# Patient Record
Sex: MALE | Race: WHITE | NOT HISPANIC OR LATINO | Employment: FULL TIME | URBAN - NONMETROPOLITAN AREA
[De-identification: names, ages, dates, MRNs, and addresses within clinical notes are randomized per-mention and may not be internally consistent; named-entity substitution may affect disease eponyms.]

---

## 2018-11-08 ENCOUNTER — OFFICE VISIT (OUTPATIENT)
Dept: ORTHOPEDIC SURGERY | Facility: CLINIC | Age: 63
End: 2018-11-08

## 2018-11-08 VITALS — HEIGHT: 71 IN | BODY MASS INDEX: 30.1 KG/M2 | WEIGHT: 215 LBS

## 2018-11-08 DIAGNOSIS — S46.002S OSTEOARTHRITIS OF LEFT SHOULDER DUE TO ROTATOR CUFF INJURY: Primary | ICD-10-CM
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
## 2018-11-08 DIAGNOSIS — S32.591A PUBIC RAMUS FRACTURE, RIGHT, CLOSED, INITIAL ENCOUNTER (HCC): ICD-10-CM

## 2018-11-08 DIAGNOSIS — M25.511 ACUTE PAIN OF RIGHT SHOULDER: ICD-10-CM

## 2018-11-08 DIAGNOSIS — M19.112 OSTEOARTHRITIS OF LEFT SHOULDER DUE TO ROTATOR CUFF INJURY: Primary | ICD-10-CM

## 2018-11-08 PROCEDURE — 99204 OFFICE O/P NEW MOD 45 MIN: CPT | Performed by: ORTHOPAEDIC SURGERY

## 2018-11-08 RX ORDER — TRAZODONE HYDROCHLORIDE 50 MG/1
TABLET ORAL
COMMUNITY
Start: 2018-11-01 | End: 2022-03-21

## 2018-11-08 RX ORDER — HYDROCHLOROTHIAZIDE 25 MG/1
TABLET ORAL
COMMUNITY
Start: 2018-08-31 | End: 2023-01-23

## 2018-11-08 RX ORDER — LOSARTAN POTASSIUM 50 MG/1
50 TABLET ORAL DAILY
COMMUNITY
Start: 2018-08-31 | End: 2021-09-20 | Stop reason: SDUPTHER

## 2018-11-08 RX ORDER — HYDROCODONE BITARTRATE AND ACETAMINOPHEN 5; 325 MG/1; MG/1
TABLET ORAL
COMMUNITY
Start: 2018-11-05 | End: 2022-03-21
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
## 2018-11-17 PROBLEM — S32.591A PUBIC RAMUS FRACTURE, RIGHT, CLOSED, INITIAL ENCOUNTER (HCC): Status: ACTIVE | Noted: 2018-11-17
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
## 2018-11-17 PROBLEM — M25.511 ACUTE PAIN OF RIGHT SHOULDER: Status: ACTIVE | Noted: 2018-11-17

## 2018-11-26 ENCOUNTER — DOCUMENTATION (OUTPATIENT)
Dept: ORTHOPEDIC SURGERY | Facility: CLINIC | Age: 63
End: 2018-11-26

## 2018-12-04 ENCOUNTER — OFFICE VISIT (OUTPATIENT)
Dept: ORTHOPEDIC SURGERY | Facility: CLINIC | Age: 63
End: 2018-12-04

## 2018-12-04 DIAGNOSIS — M25.511 ACUTE PAIN OF RIGHT SHOULDER: ICD-10-CM

## 2018-12-04 DIAGNOSIS — M25.511 RIGHT SHOULDER PAIN, UNSPECIFIED CHRONICITY: Primary | ICD-10-CM

## 2018-12-04 DIAGNOSIS — S32.591A PUBIC RAMUS FRACTURE, RIGHT, CLOSED, INITIAL ENCOUNTER (HCC): ICD-10-CM

## 2018-12-04 PROCEDURE — 99214 OFFICE O/P EST MOD 30 MIN: CPT | Performed by: ORTHOPAEDIC SURGERY

## 2018-12-04 NOTE — PROGRESS NOTES
Chief Complaint   Patient presents with   • Right Shoulder - Follow-up           HPI  Patient is here today for a follow up of his MRI of his right shoulder.  The patient is doing a little bit better but he continues to limp.  He has quite a bit of pain and discomfort over the superior and inferior pubic ramus fractures.  He is neurovascularly intact.  He does not have any shortening of the lower extremity.  He does not have a neurological deficit.  His right shoulder is bothering him considerably.  The patient is working hard with his physical therapist to improve the range of motion.  His pain tolerance has gotten somewhat better as time has gone by.  He does not have any instability of the shoulder.  He has a lot of pain and discomfort with cross body activity.  The patient has a sense of clicking and catching of the shoulder.  His symptoms appear to correlate with his MRI findings which are consistent with a labrum tear and possibly an injury to the anchor of the long head of the biceps tendon.          There were no vitals filed for this visit.        Review of Systems   Constitutional: Negative.    HENT: Negative.    Eyes: Negative.    Respiratory: Negative.    Cardiovascular: Negative.    Gastrointestinal: Negative.    Endocrine: Negative.    Genitourinary: Negative.    Musculoskeletal: Positive for gait problem.   Skin: Negative.    Allergic/Immunologic: Negative.    Hematological: Negative.    Psychiatric/Behavioral: Negative.            Physical Exam   Constitutional: He is oriented to person, place, and time. He appears well-nourished.   HENT:   Head: Atraumatic.   Eyes: EOM are normal.   Neck: Neck supple.   Cardiovascular: Normal rate and intact distal pulses.   Pulmonary/Chest: Effort normal and breath sounds normal.   Abdominal: Bowel sounds are normal.   Musculoskeletal: Normal range of motion.   Neurological: He is oriented to person, place, and time.   Skin: Skin is warm. Capillary refill takes 2  to 3 seconds.   Psychiatric: He has a normal mood and affect.   Vitals reviewed.          Joint/Body Part Specific Exam:  right shoulder. The glenohumeral joint is extremely tender over the anterior aspect of the articulation. Axillary nerve function is well preserved. Radial artery pulses are palpable. Forward flexion is associated with a click and a distinct pop. Neer sign is positive. EER sign is positive. Cee sign is positive. Skin and soft tissues are slightly swollen. AC joint is mildly tender. The pain can be reproduced with external rotation, abduction and axial loading of the joint. There is no evidence of multidirectional instability. The pain level is 6.  Flexion is 0-90°.  Active abduction is 0-120°.  Internal and external rotation associated with pain and discomfort.    Right hip and pubic ramus:    The patient does not have a clinical deformity of the hip.  Neurovascular status is intact.  He is able to perform a straight leg raise exam without too much difficulty.  Skin and soft tissues are swollen consistent with a superior pubic ramus fracture.  He does not have a neurological deficit.  There is no evidence of a sacral fracture.      X-RAY Report:        Diagnostics:  MRI reports are discussed with the patient and his wife in the office today.  These images are available today and are the bases of my recommendation for treatment.  He has a high-grade partial sided articular I did tear of the rotator cuff at its insertion on the supraspinatus.  There is an abnormal biceps labrum complex with a tear of the biceps labrum.  The distal part of the biceps tendon is thin and attenuated.  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      Dennis was seen today for follow-up.    Diagnoses and all orders for this visit:    Right shoulder pain, unspecified chronicity  -     Ambulatory Referral to Physical  Therapy    Pubic ramus fracture, right, closed, initial encounter (CMS/[facility])    Acute pain of right shoulder            Procedures        Plan: Stretching and strengthening exercises of the shoulder to prevent arthrofibrosis and a frozen shoulder.    Weightbearing as tolerated.    Continue with outpatient physical therapy to improve the gait mechanics and also to improve the range of motion and strength of shoulder function.    Intra-articular steroid injection discussed and offered to the patient for symptom control.    Tablet ibuprofen 600 mg orally twice a day for pain swelling and discomfort.    The patient is been given a note to return back to work on light duty status on 5 December 2018.  He will be on a light duty status limited to not lifting any weights heavier than 10 pounds.  He is also recommended not to lift any heavy weights in the overhead position.    Discussed with the patient that he might eventually need arthroscopic surgery for management of the labrum tear as well as the rotator cuff injury.    Risks and benefits of the surgical intervention discussed with the patient and his wife in great detail.    Patient has been diagnosed with a rotator cuff tear.  These tears can range from partial tears to complete tears of the muscle and/or tendon. They can also be categorized in size by width as being small, medium or large.  Diagnosis is confirmed with MRI or CT/arthrogram.  Management of these tears can be conservative with injections, physical therapy, activity modification and use of NSAIDS as needed.  Surgery is the only way to actually fix these tears, as they will not heal or repair on their own.  These tears can usually be repaired with arthroscopic surgery, but occasionally open procedures are necessary.  Many factors can influence the outcomes. First, larger tears have a higher chance of failure from a healing perspective, although pain may improve nonetheless, potentially longer recovery and  sometimes, depending on the quality of the tissue and the length of time the tear has been present, may not be repairable at all.  If the tear has been going on for a long time, the muscle can actually change to fatty tissue, and no longer act as muscle.  This can have a direct effect on not only the ability to repair the tear but also the outcome from the surgery itself both from a healing and functional perspective.  Therefore, the decision to proceed with surgery does have a time factor which can affect the quality of the tissue and reparability but also the potential for the tear to increase in size.  The longer you wait to repair the torn tendon there can be decreased potential for a successful outcome.    Risks of surgery have been discussed with the patient in detail.  These risks include but are not limited to possibility of infection, DVT, continued pain, continued progression of arthritis, use of allograft tissue, limited range of motion and strength and further surgical intervention.  Patient understands that the surgery will benefit mechanical symptoms of pain and instability but may not help with symptoms of arthritis.      I spent in the office today with the patient is 30 minutes of which greater than 50% of my time was spent face-to-face with the patient going over the treatment options including the time off potential surgical intervention and the healing without surgical intervention as well.

## 2019-01-29 ENCOUNTER — DOCUMENTATION (OUTPATIENT)
Dept: ORTHOPEDIC SURGERY | Facility: CLINIC | Age: 64
End: 2019-01-29

## 2019-01-29 ENCOUNTER — OFFICE VISIT (OUTPATIENT)
Dept: ORTHOPEDIC SURGERY | Facility: CLINIC | Age: 64
End: 2019-01-29

## 2019-01-29 DIAGNOSIS — M25.511 ACUTE PAIN OF RIGHT SHOULDER: ICD-10-CM

## 2019-01-29 DIAGNOSIS — S32.591A PUBIC RAMUS FRACTURE, RIGHT, CLOSED, INITIAL ENCOUNTER (HCC): ICD-10-CM
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
## 2019-01-29 PROCEDURE — 27197 CLSD TX PELVIC RING FX: CPT | Performed by: ORTHOPAEDIC SURGERY

## 2019-01-29 PROCEDURE — 99213 OFFICE O/P EST LOW 20 MIN: CPT | Performed by: ORTHOPAEDIC SURGERY

## 2019-03-12 ENCOUNTER — OFFICE VISIT (OUTPATIENT)
Dept: ORTHOPEDIC SURGERY | Facility: CLINIC | Age: 64
End: 2019-03-12

## 2019-03-12 DIAGNOSIS — M25.511 RIGHT SHOULDER PAIN, UNSPECIFIED CHRONICITY: Primary | ICD-10-CM

## 2019-03-12 DIAGNOSIS — S32.591A PUBIC RAMUS FRACTURE, RIGHT, CLOSED, INITIAL ENCOUNTER (HCC): ICD-10-CM

## 2019-03-12 PROCEDURE — 73030 X-RAY EXAM OF SHOULDER: CPT | Performed by: ORTHOPAEDIC SURGERY

## 2019-03-12 PROCEDURE — 99213 OFFICE O/P EST LOW 20 MIN: CPT | Performed by: ORTHOPAEDIC SURGERY
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
## 2019-03-24 PROBLEM — M25.511 RIGHT SHOULDER PAIN: Status: ACTIVE | Noted: 2019-03-24

## 2019-05-20 ENCOUNTER — CONVERSION ENCOUNTER (OUTPATIENT)
Dept: OTHER | Facility: HOSPITAL | Age: 64
End: 2019-05-20

## 2019-05-20 ENCOUNTER — OFFICE VISIT CONVERTED (OUTPATIENT)
Dept: CARDIOLOGY | Facility: CLINIC | Age: 64
End: 2019-05-20
Attending: SPECIALIST

## 2019-06-03 ENCOUNTER — CONVERSION ENCOUNTER (OUTPATIENT)
Dept: CARDIOLOGY | Facility: CLINIC | Age: 64
End: 2019-06-03
Attending: SPECIALIST

## 2019-06-13 ENCOUNTER — OFFICE VISIT (OUTPATIENT)
Dept: ORTHOPEDIC SURGERY | Facility: CLINIC | Age: 64
End: 2019-06-13
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
## 2019-06-13 DIAGNOSIS — M17.31 POST-TRAUMATIC OSTEOARTHRITIS OF RIGHT KNEE: ICD-10-CM

## 2019-06-13 DIAGNOSIS — S32.591A PUBIC RAMUS FRACTURE, RIGHT, CLOSED, INITIAL ENCOUNTER (HCC): ICD-10-CM

## 2019-06-13 PROCEDURE — 99214 OFFICE O/P EST MOD 30 MIN: CPT | Performed by: ORTHOPAEDIC SURGERY

## 2019-06-30 PROBLEM — M17.31 POST-TRAUMATIC OSTEOARTHRITIS OF RIGHT KNEE: Status: ACTIVE | Noted: 2019-06-30

## 2019-09-12 ENCOUNTER — OFFICE VISIT (OUTPATIENT)
Dept: ORTHOPEDIC SURGERY | Facility: CLINIC | Age: 64
End: 2019-09-12

## 2019-09-12 DIAGNOSIS — S32.591A PUBIC RAMUS FRACTURE, RIGHT, CLOSED, INITIAL ENCOUNTER (HCC): ICD-10-CM

## 2019-09-12 DIAGNOSIS — M25.511 RIGHT SHOULDER PAIN, UNSPECIFIED CHRONICITY: ICD-10-CM
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
## 2019-09-12 PROCEDURE — 99213 OFFICE O/P EST LOW 20 MIN: CPT | Performed by: ORTHOPAEDIC SURGERY

## 2019-09-12 RX ORDER — SILDENAFIL 100 MG/1
TABLET, FILM COATED ORAL
Refills: 0 | COMMUNITY
Start: 2019-06-27 | End: 2022-03-21

## 2019-09-12 RX ORDER — MONTELUKAST SODIUM 10 MG/1
TABLET ORAL
COMMUNITY
Start: 2019-09-11 | End: 2022-03-21
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
## 2019-09-23 ENCOUNTER — OFFICE VISIT CONVERTED (OUTPATIENT)
Dept: CARDIOLOGY | Facility: CLINIC | Age: 64
End: 2019-09-23
Attending: SPECIALIST

## 2019-12-12 ENCOUNTER — OFFICE VISIT (OUTPATIENT)
Dept: ORTHOPEDIC SURGERY | Facility: CLINIC | Age: 64
End: 2019-12-12

## 2019-12-12 VITALS — HEIGHT: 71 IN | BODY MASS INDEX: 31.78 KG/M2 | WEIGHT: 227 LBS

## 2019-12-12 DIAGNOSIS — S32.591A PUBIC RAMUS FRACTURE, RIGHT, CLOSED, INITIAL ENCOUNTER (HCC): Primary | ICD-10-CM

## 2019-12-12 DIAGNOSIS — M25.511 CHRONIC RIGHT SHOULDER PAIN: ICD-10-CM

## 2019-12-12 DIAGNOSIS — G89.29 CHRONIC RIGHT SHOULDER PAIN: ICD-10-CM

## 2019-12-12 PROCEDURE — 99213 OFFICE O/P EST LOW 20 MIN: CPT | Performed by: ORTHOPAEDIC SURGERY
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
## 2020-03-19 ENCOUNTER — OFFICE VISIT (OUTPATIENT)
Dept: ORTHOPEDIC SURGERY | Facility: CLINIC | Age: 65
End: 2020-03-19

## 2020-03-19 VITALS — WEIGHT: 219 LBS | BODY MASS INDEX: 31.35 KG/M2 | TEMPERATURE: 97.5 F | HEIGHT: 70 IN

## 2020-03-19 DIAGNOSIS — M25.511 RIGHT SHOULDER PAIN, UNSPECIFIED CHRONICITY: Primary | ICD-10-CM

## 2020-03-19 DIAGNOSIS — S32.591A PUBIC RAMUS FRACTURE, RIGHT, CLOSED, INITIAL ENCOUNTER (HCC): ICD-10-CM

## 2020-03-19 PROCEDURE — 99213 OFFICE O/P EST LOW 20 MIN: CPT | Performed by: ORTHOPAEDIC SURGERY

## 2020-03-19 PROCEDURE — 73502 X-RAY EXAM HIP UNI 2-3 VIEWS: CPT | Performed by: ORTHOPAEDIC SURGERY

## 2020-03-19 PROCEDURE — 20610 DRAIN/INJ JOINT/BURSA W/O US: CPT | Performed by: ORTHOPAEDIC SURGERY

## 2020-03-19 RX ADMIN — METHYLPREDNISOLONE ACETATE 160 MG: 80 INJECTION, SUSPENSION INTRA-ARTICULAR; INTRALESIONAL; INTRAMUSCULAR; SOFT TISSUE at 14:14

## 2020-03-19 RX ADMIN — LIDOCAINE HYDROCHLORIDE 2 ML: 10 INJECTION, SOLUTION EPIDURAL; INFILTRATION; INTRACAUDAL; PERINEURAL at 14:14

## 2020-04-02 RX ORDER — LIDOCAINE HYDROCHLORIDE 10 MG/ML
2 INJECTION, SOLUTION EPIDURAL; INFILTRATION; INTRACAUDAL; PERINEURAL
Status: COMPLETED | OUTPATIENT
Start: 2020-03-19 | End: 2020-03-19

## 2020-04-02 RX ORDER — METHYLPREDNISOLONE ACETATE 80 MG/ML
160 INJECTION, SUSPENSION INTRA-ARTICULAR; INTRALESIONAL; INTRAMUSCULAR; SOFT TISSUE
Status: COMPLETED | OUTPATIENT
Start: 2020-03-19 | End: 2020-03-19

## 2020-07-09 ENCOUNTER — OFFICE VISIT (OUTPATIENT)
Dept: ORTHOPEDIC SURGERY | Facility: CLINIC | Age: 65
End: 2020-07-09

## 2020-07-09 ENCOUNTER — HOSPITAL ENCOUNTER (OUTPATIENT)
Dept: OTHER | Facility: HOSPITAL | Age: 65
Discharge: HOME | End: 2020-07-09
Attending: ORTHOPAEDIC SURGERY

## 2020-07-09 DIAGNOSIS — S32.591A PUBIC RAMUS FRACTURE, RIGHT, CLOSED, INITIAL ENCOUNTER (HCC): Primary | ICD-10-CM

## 2020-07-09 DIAGNOSIS — M25.551 RIGHT HIP PAIN: ICD-10-CM

## 2020-07-09 PROCEDURE — 99213 OFFICE O/P EST LOW 20 MIN: CPT | Performed by: ORTHOPAEDIC SURGERY

## 2020-07-09 PROCEDURE — 20610 DRAIN/INJ JOINT/BURSA W/O US: CPT | Performed by: ORTHOPAEDIC SURGERY

## 2020-07-09 RX ADMIN — METHYLPREDNISOLONE ACETATE 160 MG: 80 INJECTION, SUSPENSION INTRA-ARTICULAR; INTRALESIONAL; INTRAMUSCULAR; SOFT TISSUE at 16:00

## 2020-07-09 RX ADMIN — LIDOCAINE HYDROCHLORIDE 2 ML: 10 INJECTION, SOLUTION INFILTRATION; PERINEURAL at 16:00

## 2020-07-20 PROBLEM — M25.551 RIGHT HIP PAIN: Status: ACTIVE | Noted: 2020-07-20

## 2020-07-20 RX ORDER — METHYLPREDNISOLONE ACETATE 80 MG/ML
160 INJECTION, SUSPENSION INTRA-ARTICULAR; INTRALESIONAL; INTRAMUSCULAR; SOFT TISSUE
Status: COMPLETED | OUTPATIENT
Start: 2020-07-09 | End: 2020-07-09

## 2020-07-20 RX ORDER — LIDOCAINE HYDROCHLORIDE 10 MG/ML
2 INJECTION, SOLUTION INFILTRATION; PERINEURAL
Status: COMPLETED | OUTPATIENT
Start: 2020-07-09 | End: 2020-07-09

## 2020-07-27 ENCOUNTER — OFFICE VISIT CONVERTED (OUTPATIENT)
Dept: CARDIOLOGY | Facility: CLINIC | Age: 65
End: 2020-07-27
Attending: SPECIALIST

## 2020-10-21 DIAGNOSIS — M17.31 POST-TRAUMATIC OSTEOARTHRITIS OF RIGHT KNEE: Primary | ICD-10-CM

## 2020-10-21 DIAGNOSIS — S32.591A PUBIC RAMUS FRACTURE, RIGHT, CLOSED, INITIAL ENCOUNTER (HCC): ICD-10-CM

## 2020-10-22 ENCOUNTER — OFFICE VISIT (OUTPATIENT)
Dept: ORTHOPEDIC SURGERY | Facility: CLINIC | Age: 65
End: 2020-10-22

## 2020-10-22 ENCOUNTER — HOSPITAL ENCOUNTER (OUTPATIENT)
Dept: OTHER | Facility: HOSPITAL | Age: 65
Discharge: HOME | End: 2020-10-22
Attending: ORTHOPAEDIC SURGERY

## 2020-10-22 VITALS — BODY MASS INDEX: 31.5 KG/M2 | HEIGHT: 70 IN | WEIGHT: 220 LBS | TEMPERATURE: 98.2 F

## 2020-10-22 DIAGNOSIS — M70.61 GREATER TROCHANTERIC BURSITIS OF RIGHT HIP: Primary | ICD-10-CM

## 2020-10-22 DIAGNOSIS — M17.31 POST-TRAUMATIC OSTEOARTHRITIS OF RIGHT KNEE: ICD-10-CM

## 2020-10-22 PROCEDURE — 20610 DRAIN/INJ JOINT/BURSA W/O US: CPT | Performed by: ORTHOPAEDIC SURGERY

## 2020-10-22 RX ADMIN — METHYLPREDNISOLONE ACETATE 160 MG: 80 INJECTION, SUSPENSION INTRA-ARTICULAR; INTRALESIONAL; INTRAMUSCULAR; SOFT TISSUE at 16:31

## 2020-10-22 RX ADMIN — LIDOCAINE HYDROCHLORIDE 2 ML: 10 INJECTION, SOLUTION EPIDURAL; INFILTRATION; INTRACAUDAL; PERINEURAL at 16:31

## 2020-10-22 NOTE — PROGRESS NOTES
{AS New Follow Up Note Header:24658}    Patient: Dennis Trinh Jr.  ?  YOB: 1955    MRN: [id number]  ?  Chief Complaint   Patient presents with   • Right Knee - Follow-up, Pain   • Right Hip - Follow-up, Pain      ?  HPI: F/U R HIP R KNEE  Dennis Rivas Jr.      [facility]  Radiation: {asradiationpain:53669}  Quality: {asquality:58206}  Intensity/Severity: {asseveritypain:95039}  Duration: {Time:79316}  Onset quality: {asonsetquality:55812}  Timing: {astimin}  Aggravating Factors: {AS Aggravating Factors Ortho:86000}  Alleviating Factors: {AS Alleviating Factors:27478}  Previous Episodes: {aspreviousepisodes:46858}  Associated Symptoms: {asassociatedsymptoms:53336}  ADLs Affected: {ADL ASMEH:62722}  Previous Treatment: ***    This patient is {new/est pt:3032867092}.  This problem {AS IS/NOT:63360} new to this examiner.      Allergies: No Known Allergies    Medications:   Home Medications:  Current Outpatient Medications on File Prior to Visit   Medication Sig   • Diclofenac Sodium (PENNSAID) 2 % solution Apply two pumps to the affected area twice a day prn pain   • hydrochlorothiazide (HYDRODIURIL) 25 MG tablet    • HYDROcodone-acetaminophen (NORCO) 5-325 MG per tablet    • losartan (COZAAR) 50 MG tablet    • montelukast (SINGULAIR) 10 MG tablet    • sildenafil (VIAGRA) 100 MG tablet 1/2 TO 1 TABLET BY MOUTH EVERY DAY AS NEEDED   • traZODone (DESYREL) 50 MG tablet      No current facility-administered medications on file prior to visit.      Current Medications:  Scheduled Meds:  PRN Meds:.    I have reviewed the patient's medical history in detail and updated the computerized patient record.  Review and summarization of old records include:    History reviewed. No pertinent past medical history.  History reviewed. No pertinent surgical history.  Social History     Occupational History   • Not on file   Tobacco Use   • Smoking status: Never Smoker   • Smokeless  "tobacco: Never Used   Substance and Sexual Activity   • Alcohol use: No   • Drug use: Defer   • Sexual activity: Not on file      History reviewed. No pertinent family history.      Review of Systems       Wt Readings from Last 3 Encounters:   10/22/20 99.8 kg (220 lb)   03/19/20 99.3 kg (219 lb)   12/12/19 103 kg (227 lb)     Ht Readings from Last 3 Encounters:   10/22/20 177.8 cm (70\")   03/19/20 176.5 cm (69.5\")   12/12/19 179.1 cm (70.5\")     Body mass index is 31.57 kg/m².  Facility age limit for growth percentiles is 20 years.  Vitals:    10/22/20 1624   Temp: 98.2 °F (36.8 °C)         Physical Exam  ***      Ortho Exam:   {Musculoskeletal Exams:28712}    {Post Op Total Joint Arthroplasty Exam:62073}    {Post Op Detailed Exam:09639}      Diagnostics:  {Diagnostics options AS:69965}      Assessment:  Diagnoses and all orders for this visit:    1. Greater trochanteric bursitis of right hip (Primary)    2. Post-traumatic osteoarthritis of right knee          Procedures  ?    Plan    · Compression/brace  · Rest, ice, compression, and elevation (RICE) therapy  · Stretching and strengthening exercises  · {OTC pain:93574}  · Follow up in *** {WEEKS/MONTHS:95928}    Date of encounter: 10/22/2020   Jewel Mcclure MD  "

## 2020-10-22 NOTE — PROGRESS NOTES
INJECTION    Patient: Dennis Trinh Jr.    YOB: 1955    MRN: [id number]    Chief Complaints: right hip and right knee    History of Present Illness: Patient returns today for right knee pain.  His pain is located over the medial and lateral aspect of the joint.  The pain has been progressive in nature and remains intermittent .  His pain is worsened by going up and down stairs, kneeling, rising after sitting. There has been improvement in the past with injections.   Patient returns for follow-up on hip pain. The pain is over the lateral aspect of the right hip at the greater trochanteric bursa.  His pain has been progressive in nature and remains intermittent .   His pain is worsened  going up and down stairs, rising after sitting, squatting. There has been improvement in the past with injections.    This problem is not new to this examiner.     Allergies: No Known Allergies    Medications:   Home Medications:  Current Outpatient Medications on File Prior to Visit   Medication Sig   • Diclofenac Sodium (PENNSAID) 2 % solution Apply two pumps to the affected area twice a day prn pain   • hydrochlorothiazide (HYDRODIURIL) 25 MG tablet    • HYDROcodone-acetaminophen (NORCO) 5-325 MG per tablet    • losartan (COZAAR) 50 MG tablet    • montelukast (SINGULAIR) 10 MG tablet    • sildenafil (VIAGRA) 100 MG tablet 1/2 TO 1 TABLET BY MOUTH EVERY DAY AS NEEDED   • traZODone (DESYREL) 50 MG tablet      No current facility-administered medications on file prior to visit.      Current Medications:  Scheduled Meds:  PRN Meds:.    I have reviewed the patient's medical history in detail and updated the computerized patient record.  Review and summarization of old records include:    History reviewed. No pertinent past medical history.  History reviewed. No pertinent surgical history.  Social History     Occupational History   • Not on file   Tobacco Use   • Smoking status: Never Smoker   • Smokeless tobacco: Never  "Used   Substance and Sexual Activity   • Alcohol use: No   • Drug use: Defer   • Sexual activity: Not on file      Social History     Social History Narrative   • Not on file     History reviewed. No pertinent family history.    Review of Systems  Constitutional: Negative for appetite change.   HENT: Negative.    Eyes: Negative.    Respiratory: Negative.    Cardiovascular: Negative.    Gastrointestinal: Negative.    Endocrine: Negative.    Genitourinary: Negative.    Musculoskeletal: See details of exam below.  Skin: Negative.    Allergic/Immunologic: Negative.    Hematological: Negative.    Psychiatric/Behavioral: Negative.          Wt Readings from Last 3 Encounters:   10/22/20 99.8 kg (220 lb)   03/19/20 99.3 kg (219 lb)   12/12/19 103 kg (227 lb)     Ht Readings from Last 3 Encounters:   10/22/20 177.8 cm (70\")   03/19/20 176.5 cm (69.5\")   12/12/19 179.1 cm (70.5\")     Body mass index is 31.57 kg/m².  Facility age limit for growth percentiles is 20 years.  Vitals:    10/22/20 1624   Temp: 98.2 °F (36.8 °C)       Physical Exam  Constitutional: Patient is oriented to person, place, and time. Appears well-developed and well-nourished.   HENT:   Head: Normocephalic and atraumatic.   Eyes: Conjunctivae and EOM are normal. Pupils are equal, round, and reactive to light.   Cardiovascular: Normal rate, regular rhythm, normal heart sounds and intact distal pulses.   Pulmonary/Chest: Effort normal and breath sounds normal.   Musculoskeletal:   See detailed exam below   Neurological: Alert and oriented to person, place, and time. No sensory deficit. Coordination normal.   Skin: Skin is warm and dry. Capillary refill takes less than 2 seconds. No rash noted. No erythema.   Psychiatric: Patient has a normal mood and affect. His behavior is normal. Judgment and thought content normal.   Nursing note and vitals reviewed.    Musculoskeletal:    Right knee (valgus). Positive grind test. Pain and tenderness is present over the " lateral aspect of the knee. Patient has some tenderness and irritability of the common peroneal nerve. Lateral joint line is exquisitely painful and tender. Patella is tending to track a little bit laterally. There is thickening of the synovial membrane. Range of motion in flexion is 0-100 and degrees. Dorsalis pedis and posterior tibial artery pulses are palpable. Common peroneal nerve function is well preserved. Gait is cautious and antalgic. The patient has valgus orientation of the knee with a higher degree of external rotation than the contralateral side.There is fullness and tenderness in the Popliteal fossa. Getting out of a seated position is painful for the patient.   Right hip (gtb): Skin and soft tissues over the greater trochanteric bursa are tender upon palpation, along with IT band tenderness. Cross body adduction is negative. Internal and external rotations are bothersome and cause tenderness over the greater trochanter. There is no clinical deformity and no shortening. He does have a limp upon walking. There is piriformis tightness and there  is capsular tightness with any rotation. Dorsalis pedis and posterior tibial artery pulses are palpable. Neurovascular status is intact and capillary refill is less than 2 seconds. Common peroneal nerve function is well preserved.      Diagnostics:      Procedure:  Large Joint Arthrocentesis: R knee  Date/Time: 10/22/2020 4:31 PM  Consent given by: patient  Site marked: site marked  Timeout: Immediately prior to procedure a time out was called to verify the correct patient, procedure, equipment, support staff and site/side marked as required   Supporting Documentation  Indications: pain   Procedure Details  Location: knee - R knee  Preparation: Patient was prepped and draped in the usual sterile fashion  Needle size: 25 G  Approach: anteromedial  Medications administered: 160 mg methylPREDNISolone acetate 80 MG/ML; 2 mL lidocaine PF 1% 1 %  Patient tolerance:  patient tolerated the procedure well with no immediate complications    Large Joint Arthrocentesis: R greater trochanteric bursa  Date/Time: 10/22/2020 4:31 PM  Consent given by: patient  Site marked: site marked  Timeout: Immediately prior to procedure a time out was called to verify the correct patient, procedure, equipment, support staff and site/side marked as required   Supporting Documentation  Indications: pain   Procedure Details  Location: hip - R greater trochanteric bursa  Preparation: Patient was prepped and draped in the usual sterile fashion  Needle size: 25 G  Approach: anteromedial  Medications administered: 160 mg methylPREDNISolone acetate 80 MG/ML; 2 mL lidocaine PF 1% 1 %  Patient tolerance: patient tolerated the procedure well with no immediate complications          Assessment:   Diagnoses and all orders for this visit:    1. Greater trochanteric bursitis of right hip (Primary)    2. Post-traumatic osteoarthritis of right knee          Plan:   · Injected patient's right knee and hip-greater trochanteric bursa joint(s)with steroid from an anteromedial approach   · Compression/brace to the knee to prevent it from buckling and giving out.  · Rest, ice, compression, and elevation (RICE) therapy  · OTC Alternate Ibuprofen and Tylenol as needed   · Patient will eventually need a right total knee replacement   · Follow up in 3 month(s)    Date of encounter: 10/22/2020   Jewel Mcclure MD

## 2020-10-26 RX ORDER — LIDOCAINE HYDROCHLORIDE 10 MG/ML
2 INJECTION, SOLUTION EPIDURAL; INFILTRATION; INTRACAUDAL; PERINEURAL
Status: COMPLETED | OUTPATIENT
Start: 2020-10-22 | End: 2020-10-22

## 2020-10-26 RX ORDER — METHYLPREDNISOLONE ACETATE 80 MG/ML
160 INJECTION, SUSPENSION INTRA-ARTICULAR; INTRALESIONAL; INTRAMUSCULAR; SOFT TISSUE
Status: COMPLETED | OUTPATIENT
Start: 2020-10-22 | End: 2020-10-22

## 2021-01-07 ENCOUNTER — TELEPHONE (OUTPATIENT)
Dept: ORTHOPEDIC SURGERY | Facility: CLINIC | Age: 66
End: 2021-01-07

## 2021-01-07 NOTE — TELEPHONE ENCOUNTER
Caller: COLLETTE    Relationship: [facility]     Best call back number: [phone]    What form or medical record are you requesting: PN FROM 1/29/2019 TO PRESENT.     Who is requesting this form or medical record from you:     How would you like to receive the form or medical records (pick-up, mail, fax): FAX  If fax, what is the fax number: [phone]  Timeframe paperwork needed: ASAP    Additional notes: PT  FOR WORK COMP CALLED STATING SHE HASNT RECEIVED ANY PN SINCE 1/29/2019 AND NEEDS ALL OF THE ONES UP TO PRESENT DATE.

## 2021-01-11 NOTE — TELEPHONE ENCOUNTER
I CALLED COLLETTE TO LET HER KNOW THAT SHE WOULD NEED TO CONTACT [facility] REGARDING THESE RECORDS.  SHE EXPRESSED UNDERSTANDING AND WILL CONTACT THEM

## 2021-02-01 ENCOUNTER — OFFICE VISIT CONVERTED (OUTPATIENT)
Dept: CARDIOLOGY | Facility: CLINIC | Age: 66
End: 2021-02-01
Attending: SPECIALIST

## 2021-02-04 ENCOUNTER — OFFICE VISIT (OUTPATIENT)
Dept: ORTHOPEDIC SURGERY | Facility: CLINIC | Age: 66
End: 2021-02-04

## 2021-02-04 VITALS — WEIGHT: 212 LBS | HEIGHT: 71 IN | TEMPERATURE: 96.4 F | BODY MASS INDEX: 29.68 KG/M2

## 2021-02-04 DIAGNOSIS — M17.31 POST-TRAUMATIC OSTEOARTHRITIS OF RIGHT KNEE: Primary | ICD-10-CM

## 2021-02-04 DIAGNOSIS — M25.551 RIGHT HIP PAIN: ICD-10-CM

## 2021-02-04 PROCEDURE — 99213 OFFICE O/P EST LOW 20 MIN: CPT | Performed by: ORTHOPAEDIC SURGERY

## 2021-02-04 RX ORDER — TADALAFIL 10 MG/1
TABLET ORAL
COMMUNITY
Start: 2021-01-12 | End: 2022-03-21

## 2021-02-04 RX ORDER — SILDENAFIL 50 MG/1
TABLET, FILM COATED ORAL
COMMUNITY
Start: 2021-01-13

## 2021-02-04 NOTE — PROGRESS NOTES
"Chief Complaint  Follow-up of the Right Knee and Follow-up of the Right Hip    Subjective          Dennis Trinh Jr. presents to [facility] for right-sided hip and pelvic pain and persistent right knee pain.  History of Present Illness the patient presents with increasing pain and discomfort in his hip.  The pain is centered over the ischial tuberosity.  He has a difficult time sitting for prolonged periods of time.  He is also started to limp on the right side.  He states that he has been favoring the right lower extremity over the left arm.  This has led to a progressive varus deformity and difficulty with ambulation.  She has difficulty in going up and down the steps and feels that the knee maikol and gives out from underneath him.  The patient understands that he will most likely eventually need knee replacement surgery.  He would like to defer that intervention as long as possible.  The patient has recently been diagnosed with a Covid infection and would like to recover from that before he can template any type of surgical intervention.    Objective   Vital Signs:   Temp 96.4 °F (35.8 °C)   Ht 179.1 cm (70.5\")   Wt 96.2 kg (212 lb)   BMI 29.99 kg/m²     Physical Exam    Right knee. Patient has crepitus throughout range of motion. Positive patellar grind test. Mild effusion. Lachman is negative. Pivot shift is negative. Anterior and posterior drawer signs are negative. Significant joint line tenderness is noted on the medial aspect of the knee. Patient has a varus orientation of the knee. There is fullness and tenderness in the Popliteal fossa. Mild distention of a Popliteal cyst is noted in this location. Range of motion in flexion is from 0-110 degrees. Neurovascular status is intact.  Dorsalis pedis and posterior tibial artery pulses are palpable. Common peroneal nerve function is well preserved. Patient's gait is cautious and antalgic. Skin and soft " tissues are mildly swollen, consistent with synovitis and effusion. The patient has a significant limp with the first few steps after starting the gait cycle. Getting out of a chair takes a lot of effort due to pain on knee flexion.    Right hip: Skin and soft tissues over the greater trochanteric bursa are painful and tender for the patient. Neurovascular status is intact. IT band is painful and tender. Cross body adduction bothers the patient significantly. Internal and external rotations bother the patient significantly with tenderness over the greater trochanter. There is no clinical deformity. No shortening. The patient does have a significant limp because of the trochanteric pain caused by the abductors. The piriformis is tight and with any rotation there is significant capsular tightness. Dorsalis pedis and posterior tibal artery pulses are palpable. Capillary refill is 2 seconds with a brisk return. Common peroneal nerve function is well preserved.  Result Review :       Data reviewed: Radiologic studies AP and lateral x-rays of the right knee are available.       right Knee X-Ray  Indication: Pain and limp with a progressive varus deformity.  AP, Lateral views  Findings: Advanced degenerative osteoarthritis with bone-on-bone appearance with complete loss of medial joint space.  no bony lesion  Soft tissues within normal limits  decreased joint spaces  Hardware appropriately positioned not applicable      no prior studies available for comparison.    This patient's x-ray report was graded according to the Kellgren and Bony classification.  This took into account the joint space narrowing, osteophyte formation, sclerosis of the distal femur/proximal tibia along with deformity of those bones.  The findings were indicative of K L grade 3.    X-RAY was ordered and reviewed by Jewel Mcclure MD  Assessment and Plan    Problem List Items Addressed This Visit        Other    Post-traumatic osteoarthritis of  right knee - Primary    Right hip pain          Follow Up   Use a supportive brace to the knee to prevent it from buckling and giving out.  Calcium and vitamin D for bone health.  Intra-articular steroid injection and viscosupplementation injections discussed with the patient.  Falls precaution discussed with the patient.  Since the patient's symptoms are mostly related to the ischial tuberosity he is not a candidate for hip replacement surgery and I have discussed that with him.  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  Patient was given instructions and counseling regarding his condition or for health maintenance advice. Please see specific information pulled into the AVS if appropriate.

## 2021-02-12 ENCOUNTER — TELEPHONE (OUTPATIENT)
Dept: ORTHOPEDIC SURGERY | Facility: CLINIC | Age: 66
End: 2021-02-12

## 2021-02-12 DIAGNOSIS — M17.31 POST-TRAUMATIC OSTEOARTHRITIS OF RIGHT KNEE: Primary | ICD-10-CM

## 2021-02-12 NOTE — TELEPHONE ENCOUNTER
Go ahead and order an AP and lateral of the knee if that is required for the patient's ROBERT.  If you get the x-ray done now we could make an addendum in his chart from last week regarding the x-ray thank you

## 2021-02-12 NOTE — TELEPHONE ENCOUNTER
PATIENT STATES HE IS HAVING AN ROBERT ON April 18TH AND THAT PHYSICIAN IS REQUESTING A NEW AP/LATERAL VIEW OF THE RIGHT KNEE.     CAN WE ORDER THAT, AND IF SO, PLEASE PUT AN ORDER IN AND I CAN CALL THE PATIENT BACK.

## 2021-02-17 ENCOUNTER — HOSPITAL ENCOUNTER (OUTPATIENT)
Dept: OTHER | Facility: HOSPITAL | Age: 66
Discharge: HOME | End: 2021-02-17

## 2021-02-17 ENCOUNTER — OFFICE VISIT (OUTPATIENT)
Dept: ORTHOPEDIC SURGERY | Facility: CLINIC | Age: 66
End: 2021-02-17

## 2021-02-17 VITALS — BODY MASS INDEX: 29.68 KG/M2 | WEIGHT: 212 LBS | HEIGHT: 71 IN | TEMPERATURE: 96.9 F
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
## 2021-02-17 DIAGNOSIS — M25.512 ACUTE PAIN OF LEFT SHOULDER: Primary | ICD-10-CM

## 2021-02-17 PROCEDURE — 99214 OFFICE O/P EST MOD 30 MIN: CPT | Performed by: PHYSICIAN ASSISTANT

## 2021-02-17 NOTE — PROGRESS NOTES
"Chief Complaint  Pain of the Left Shoulder and Establish Care    Subjective    History of Present Illness      Dennis Trinh Jr. is a 66 y.o. male who presents to [facility] for new complaint of   Shoulder Pain:   Patient complaints of left shoulder pain.   This is evaluated as a personal injury on 2/14/21.   The pain is described as burning and rated as moderate-severe.    The onset of the pain was sudden, related to a fall from standing. Mechanism of injury: fall.    The pain occurs continuously.  Location is lateral. No history of dislocation.   Symptoms are aggravated by all activities.   Symptoms are diminished by  rest, ice, medication: Naprosyn used and beneficial.     Limited activities include: all activities.   Weakness and limited ROM are reported.             Objective   Vital Signs:   Temp 96.9 °F (36.1 °C)   Ht 179.1 cm (70.51\")   Wt 96.2 kg (212 lb)   BMI 29.98 kg/m²     Physical Exam  Vitals signs and nursing note reviewed.   Constitutional:       Appearance: Normal appearance.   Pulmonary:      Effort: Pulmonary effort is normal.   Skin:     General: Skin is warm and dry.      Capillary Refill: Capillary refill takes less than 2 seconds.   Neurological:      General: No focal deficit present.      Mental Status: He is alert and oriented to person, place, and time. Mental status is at baseline.   Psychiatric:         Mood and Affect: Mood normal.         Behavior: Behavior normal.         Thought Content: Thought content normal.         Judgment: Judgment normal.     Ortho Exam   LEFT shoulder  Positive for significant tenderness at the anterior aspect of the shoulder and at the insertion of the rotator cuff over the greater tuberosity of the humerus.   Positive for tenderness with palpation of the AC joint.  Soft tissue tenderness is noted.   Slight proximal migration of the humeral head is noted.   Forward flexion is 0-.30 degrees, abduction is 0-30 degrees, " external rotation is 0-30 degrees.   Positive for decreased strength in abduction and external rotation.   Crossover adduction test is positive.    Drop arm sign is positive.  Sulcus sign is negative.    Neer test is positive on compression.  The pain level is 7.  There is no evidence of multidirectional instability.        Result Review :   Radiologic studies - see below for interpretation  Left shoulder xrays 4 views were ordered by his PCP and performed at [facility] on 2/17/21. Images were independently viewed and interpreted by myself, my impression as follows:  Findings: Mild to moderate glenohumeral arthritis, evidence of prior fracture of mid clavicle that has formed great callus formation, mild AC joint arthritis  Bony lesion: no  Soft tissues: within normal limits  Joint spaces: subnormal  Hardware appropriately positioned: not applicable  Prior studies available for comparison: no                   Assessment   Assessment and Plan    Problem List Items Addressed This Visit        Musculoskeletal and Injuries    Acute pain of left shoulder - Primary    Relevant Orders    MRI Shoulder Left Without Contrast       Other    Fall from slipping on ice, initial encounter    Relevant Orders    MRI Shoulder Left Without Contrast          Follow Up   · Discussion of any imaging in detail. Discussion of orthopaedic goals.  · Risk, benefits, and merits of treatment alternatives reviewed with the patient. Treatment alternatives include: oral anti-inflammatories/NSAID, intra-articular steroid injection and further imaging/testing  · Ice, heat, and/or modalities as beneficial  · To schedule MRI of left shoulder due to concern for rotator cuff tear.  · Patient is encouraged to call or return for any issues or concerns.  · Follow up will be based on when MRI has been performed  • Patient was given instructions and counseling regarding his condition or for health maintenance advice. Please see specific  information pulled into the AVS if appropriate.     Kirt Guthrie PA-C   Date of Encounter: 2/17/2021   Electronically signed by Kirt Guthrie PA-C, 02/17/21, 10:29 AM EST.     EMR Dragon/Transcription disclaimer:  Much of this encounter note is an electronic transcription/translation of spoken language to printed text. The electronic translation of spoken language may permit erroneous, or at times, nonsensical words or phrases to be inadvertently transcribed; Although I have reviewed the note for such errors, some may still exist.

## 2021-02-19 ENCOUNTER — TELEPHONE (OUTPATIENT)
Dept: ORTHOPEDIC SURGERY | Facility: CLINIC | Age: 66
End: 2021-02-19

## 2021-02-19 NOTE — TELEPHONE ENCOUNTER
LEFT VOICEMAIL FOR PATIENT TO VERIFY HIS PCP.  WE RECEIVED A FAX FROM DR. ARREOLA'S OFFICE THAT PATIENT TRANSFERRED CARE.  PLEASE VERIFY PCP

## 2021-02-26 ENCOUNTER — TELEPHONE (OUTPATIENT)
Dept: ORTHOPEDIC SURGERY | Facility: CLINIC | Age: 66
End: 2021-02-26

## 2021-03-01 ENCOUNTER — TELEPHONE (OUTPATIENT)
Dept: ORTHOPEDIC SURGERY | Facility: CLINIC | Age: 66
End: 2021-03-01

## 2021-03-01 DIAGNOSIS — M25.512 ACUTE PAIN OF LEFT SHOULDER: Primary | ICD-10-CM
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
## 2021-03-01 RX ORDER — TRAMADOL HYDROCHLORIDE 50 MG/1
TABLET ORAL
Qty: 30 TABLET | Refills: 0 | Status: SHIPPED | OUTPATIENT
Start: 2021-03-01 | End: 2022-03-21

## 2021-03-10 DIAGNOSIS — M25.512 ACUTE PAIN OF LEFT SHOULDER: Primary | ICD-10-CM

## 2021-03-10 NOTE — TELEPHONE ENCOUNTER
CALLED TO LET THE PATIENT KNOW THAT JF STATES THAT THE MRI FOR HIS LEFT SHOULDER DOES NOT MEET MEDICAL NECESSITY. HE RESPONDED THAT SOUNDS JUST LIKE THEM AND THAT HE HAS A SLING AND IS HE FINDS THE SWEET SPOT IT HELPS.  I TOLD HIM I WOULD CHECK WITH YOU TO SEE THE NEXT STEP.  PLEASE ADVISE/TREY  
I called to let patient know I am going to try to do a peer to peer review if possible.  
PATIENT CALLED AND STATED THAT HE SPOKE WITH HIS INSURANCE COMPANY AND THEY WILL REQUIRE PEER TO PEER TO TRY TO GET MRI APPROVAL  
Peer to peer has been completed this morning and was approved. [facility] will be scheduling it soon. It will have to be scheduled by 3/30/21 based on the auth provided by insurance.  
Yes

## 2021-03-24 ENCOUNTER — HOSPITAL ENCOUNTER (OUTPATIENT)
Dept: OTHER | Facility: HOSPITAL | Age: 66
Discharge: HOME | End: 2021-03-24
Attending: PHYSICIAN ASSISTANT

## 2021-03-26 ENCOUNTER — OFFICE VISIT (OUTPATIENT)
Dept: ORTHOPEDIC SURGERY | Facility: CLINIC | Age: 66
End: 2021-03-26

## 2021-03-26 DIAGNOSIS — S46.812A INFRASPINATUS TENDON TEAR, LEFT, INITIAL ENCOUNTER: ICD-10-CM

## 2021-03-26 DIAGNOSIS — M19.012 ARTHRITIS OF LEFT GLENOHUMERAL JOINT: ICD-10-CM

## 2021-03-26 DIAGNOSIS — S46.812A TRAUMATIC TEAR OF SUPRASPINATUS TENDON OF LEFT SHOULDER, INITIAL ENCOUNTER: Primary | ICD-10-CM

## 2021-03-26 PROCEDURE — 99441 PR PHYS/QHP TELEPHONE EVALUATION 5-10 MIN: CPT | Performed by: PHYSICIAN ASSISTANT

## 2021-03-26 NOTE — PROGRESS NOTES
You have chosen to receive care through a telephone visit. Do you consent to use a telephone visit for your medical care today? Yes     Chief Complaint  Results of the Left Shoulder    Subjective    History of Present Illness      Dennis Trinh Jr. is a 66 y.o. male who presents to [facility] via telephone visit for follow-up on left shoulder pain and MRI results of left shoulder.  I initially saw him 2/17/2021 for complaint of left shoulder pain after an injury on 2/14/2021.  He reports he fell from a standing position and onto the shoulder.  His pain is described as burning and rated at moderate to severe.  Pain is worse with any activity, especially reaching or lifting.  He also reported significant weakness and limited range of motion. He reports he is getting a little more ROM with exercising.         Objective   Vital Signs:   There were no vitals taken for this visit.    Physical Exam  Musculoskeletal:      Comments: See detailed ortho exam from prior visit   Neurological:      Mental Status: He is alert and oriented to person, place, and time. Mental status is at baseline.   Psychiatric:         Mood and Affect: Mood normal.         Behavior: Behavior normal.         Thought Content: Thought content normal.         Judgment: Judgment normal.       Ortho Exam   LEFT shoulder  Positive for significant tenderness at the anterior aspect of the shoulder and at the insertion of the rotator cuff over the greater tuberosity of the humerus.   Positive for tenderness with palpation of the AC joint.  Soft tissue tenderness is noted.   Slight proximal migration of the humeral head is noted.   Forward flexion is 0-.30 degrees, abduction is 0-30 degrees, external rotation is 0-30 degrees.   Positive for decreased strength in abduction and external rotation.   Crossover adduction test is positive.    Drop arm sign is positive.  Sulcus sign is negative.    Neer test is positive on  compression.  The pain level is 7.  There is no evidence of multidirectional instability.    The above exam is historical and is used as a reference for this telephone visit.    Result Review :   Radiologic studies - see below for interpretation  Reviewed MRI report of left shoulder, performed at  [facility] on 3/24/21, summary of impression below:  · Supraspinatus tendon shows complete retracted tear measuring up to 2.8 cm in transverse dimension.  · Humeral head shows subchondral cystic change along the superior aspect  · Infraspinatus tendon shows partial articular sided tearing of the superior to mid fibers with adjacent subchondral cystic change  · Subscapularis tendon shows partial articular sided tearing of the superior to mid fibers  · AC joint shows mild degenerative change  · Glenohumeral joint shows mild to moderate degenerative change  · Positive for small joint effusion  · There is pan labral degenerative tearing  · Biceps tendon appears intact, although there is fluid surrounding the extracapsular portion of the tendon      Assessment   Assessment and Plan    Problem List Items Addressed This Visit        Musculoskeletal and Injuries    Traumatic tear of supraspinatus tendon of left shoulder - Primary    Arthritis of left glenohumeral joint       Other    Infraspinatus tendon tear, left, initial encounter          Follow Up {Instructions Charge Capture  Follow-up Communications :23}  · Discussion of any imaging in detail. Discussion of orthopaedic goals.  · Risk, benefits, and merits of treatment alternatives reviewed with the patient. Treatment alternatives include: intra-articular steroid injection, physical therapy and surgery with reverse total shoulder arthroplasty.  Discussed the highly unlikely situation that the rotator cuff would be able to be repaired adequately.   · Ice, heat, and/or modalities as beneficial  · Patient is encouraged to call or return for any issues or  concerns.  · He will discuss with his wife regarding surgery. He would like to go ahead and schedule a steroid injection in the shoulder.   • Patient was given instructions and counseling regarding his condition or for health maintenance advice. Please see specific information pulled into the AVS if appropriate.   This visit has been rescheduled as a phone visit to comply with patient safety concerns in accordance with CDC recommendations. Total time of discussion was 8 minutes.     Kirt Guthrei PA-C   Date of Encounter: 3/26/2021   Electronically signed by Kirt Guthrie PA-C, 03/26/21, 12:19 PM EDT.     EMR Dragon/Transcription disclaimer:  Much of this encounter note is an electronic transcription/translation of spoken language to printed text. The electronic translation of spoken language may permit erroneous, or at times, nonsensical words or phrases to be inadvertently transcribed; Although I have reviewed the note for such errors, some may still exist.

## 2021-03-31 ENCOUNTER — CLINICAL SUPPORT (OUTPATIENT)
Dept: ORTHOPEDIC SURGERY | Facility: CLINIC | Age: 66
End: 2021-03-31

## 2021-03-31 VITALS — TEMPERATURE: 96.8 F | HEIGHT: 71 IN | BODY MASS INDEX: 29.68 KG/M2 | WEIGHT: 212 LBS

## 2021-03-31 DIAGNOSIS — S46.812D TRAUMATIC TEAR OF SUPRASPINATUS TENDON OF LEFT SHOULDER, SUBSEQUENT ENCOUNTER: Primary | ICD-10-CM

## 2021-03-31 DIAGNOSIS — M19.012 ARTHRITIS OF LEFT GLENOHUMERAL JOINT: ICD-10-CM

## 2021-03-31 PROCEDURE — 20610 DRAIN/INJ JOINT/BURSA W/O US: CPT | Performed by: PHYSICIAN ASSISTANT

## 2021-03-31 RX ORDER — AMOXICILLIN 500 MG/1
CAPSULE ORAL
COMMUNITY
Start: 2021-03-30 | End: 2022-03-21

## 2021-03-31 RX ORDER — METHYLPREDNISOLONE ACETATE 80 MG/ML
160 INJECTION, SUSPENSION INTRA-ARTICULAR; INTRALESIONAL; INTRAMUSCULAR; SOFT TISSUE
Status: COMPLETED | OUTPATIENT
Start: 2021-03-31 | End: 2021-03-31

## 2021-03-31 RX ADMIN — METHYLPREDNISOLONE ACETATE 160 MG: 80 INJECTION, SUSPENSION INTRA-ARTICULAR; INTRALESIONAL; INTRAMUSCULAR; SOFT TISSUE at 08:28

## 2021-03-31 NOTE — PROGRESS NOTES
"Chief Complaint  Follow-up and Pain of the Left Shoulder and Injections    Subjective    History of Present Illness      Dennis Trinh Jr. is a 66 y.o. male who presents to [facility] for is here today for injection therapy. He is receiving first time  LEFT shoulder injections of steroid. He recently had an MRI of the shoulder that revealed the need for a reverse total shoulder arthroplasty. He states he would like to try a rotator cuff repair. Today we will first try the steroid injection to see how he responds. Treatment options have been discussed and he understands and consents.       Objective   Vital Signs:   Temp 96.8 °F (36 °C)   Ht 179.1 cm (70.51\")   Wt 96.2 kg (212 lb)   BMI 29.98 kg/m²     Physical Exam  66 y.o. male is awake, alert, oriented, in no acute distress and well developed, well nourished.  Ortho Exam   LEFT shoulder  Positive for significant tenderness at the anterior aspect of the shoulder and at the insertion of the rotator cuff over the greater tuberosity of the humerus.   Positive for tenderness with palpation of the AC joint.  Soft tissue tenderness is noted.   Slight proximal migration of the humeral head is noted.   Forward flexion is 0-.30 degrees, abduction is 0-30 degrees, external rotation is 0-30 degrees.   Positive for decreased strength in abduction and external rotation.   Crossover adduction test is positive.    Drop arm sign is positive.  Sulcus sign is negative.    Neer test is positive on compression.  The pain level is 6.  There is no evidence of multidirectional instability.        PROCEDURE  Large Joint Arthrocentesis: L glenohumeral  Date/Time: 3/31/2021 8:28 AM  Consent given by: patient  Site marked: site marked  Timeout: Immediately prior to procedure a time out was called to verify the correct patient, procedure, equipment, support staff and site/side marked as required   Supporting Documentation  Indications: pain and joint swelling "   Procedure Details  Location: shoulder - L glenohumeral  Preparation: Patient was prepped and draped in the usual sterile fashion  Needle size: 25 G  Approach: anteromedial  Medications administered: 160 mg methylPREDNISolone acetate 80 MG/ML; 2 mL lidocaine (cardiac)  Patient tolerance: patient tolerated the procedure well with no immediate complications      Injection site was identified by physical examination and cleaned with Betadine and alcohol swabs. Prior to needle insertion, ethyl chloride spray was used for surface anesthesia. Sterile technique was used.       Assessment   Assessment and Plan    Problem List Items Addressed This Visit        Musculoskeletal and Injuries    Traumatic tear of supraspinatus tendon of left shoulder - Primary    Arthritis of left glenohumeral joint          Follow Up   • Left shoulder steroid injection was discussed with the patient. Discussed indication, risks, benefits, and alternatives. Verbal consent was given to proceed with the procedure.   • Injection was performed from anteromedial approach.  Patient tolerated the procedure well and no complications were encountered.  • Discussion of orthopedic goals and activities and patient/guardian expressed understanding.  • Ice, heat, rest, compression and elevation of extremity as beneficial  • Discussed that the better option would be a rev tsa. I would like him to further discuss with [facility].   • nsaids and/or tylenol as beneficial  • Instructed to refrain from heavy activity/rest the extremity for the next 24-48 hours  • Discussion regarding possibility of cortisol flare and what to expect if this occurs  • Watch for signs and symptoms of infection  • Call if adverse effect from injection therapy  • Follow up as scheduled  • Patient was given instructions and counseling regarding his condition or for health maintenance advice. Please see specific information pulled into the AVS if appropriate.     Kirt Guthrie PA-C    Date of Encounter: 3/31/2021   Electronically signed by Kirt Guthrie PA-C, 03/31/21, 6:44 AM EDT.     LIZ Dragon/Transcription disclaimer:  Much of this encounter note is an electronic transcription/translation of spoken language to printed text. The electronic translation of spoken language may permit erroneous, or at times, nonsensical words or phrases to be inadvertently transcribed; Although I have reviewed the note for such errors, some may still exist.

## 2021-04-02 DIAGNOSIS — S32.591A PUBIC RAMUS FRACTURE, RIGHT, CLOSED, INITIAL ENCOUNTER (HCC): Primary | ICD-10-CM

## 2021-04-08 ENCOUNTER — OFFICE VISIT (OUTPATIENT)
Dept: ORTHOPEDIC SURGERY | Facility: CLINIC | Age: 66
End: 2021-04-08

## 2021-04-08 VITALS — TEMPERATURE: 98.6 F | BODY MASS INDEX: 29.68 KG/M2 | HEIGHT: 71 IN | WEIGHT: 212 LBS

## 2021-04-08 DIAGNOSIS — M17.31 POST-TRAUMATIC OSTEOARTHRITIS OF RIGHT KNEE: ICD-10-CM

## 2021-04-08 DIAGNOSIS — S46.812D TRAUMATIC TEAR OF SUPRASPINATUS TENDON OF LEFT SHOULDER, SUBSEQUENT ENCOUNTER: Primary | ICD-10-CM

## 2021-04-08 PROCEDURE — 99213 OFFICE O/P EST LOW 20 MIN: CPT | Performed by: ORTHOPAEDIC SURGERY

## 2021-04-08 RX ORDER — FEXOFENADINE HCL 180 MG/1
TABLET ORAL
COMMUNITY
Start: 2021-04-06 | End: 2023-01-23

## 2021-04-08 NOTE — PROGRESS NOTES
Chief Complaint  Follow-up and Pain of the Right Knee and Follow-up and Pain of the Left Shoulder    Subjective    History of Present Illness      Dennis Trinh Jr. is a 66 y.o. male who presents to [facility] for right knee and left shoulder pain.  History of Present Illness the patient states that he fell on his left shoulder on a patch of ice about 8 weeks ago.  He was seen by my assistant, Kirt Guthrie.  He was diagnosed with a rotator cuff tear and given an injection of steroid to the shoulder.  He continues to have pain with weakness in abduction.  Cross body activities bother him significantly.  He has difficulty with reaching into the overhead position.  He is also having very significant pain and discomfort of the right knee.  He has difficulty in ambulation.  He has been using a brace which is very helpful in managing the symptoms.  The patient was seen by Dr. Sumanth Webb for an independent medical exam and was basically told that he needs a right total knee arthroplasty based on symptom progression and the extent of his radiographic disease.  His shoulder also bothers him quite a bit and he is going to be a candidate for reverse total shoulder arthroplasty.  There are arthritic changes in the glenohumeral joint and significant atrophy of the supraspinatus which most likely will not lend itself to a surgical repair.  Pain Location:  RIGHT knee and LEFT shoulder  Radiation: none  Quality: dull, aching  Intensity/Severity: moderate  Duration: Several months  Progression of symptoms: yes, progressive worsening  Onset quality: sudden  Timing: intermittent  Aggravating Factors: kneeling, rising after sitting, squatting, overhead reaching, reaching backward, reaching forward  Alleviating Factors: Tylenol, NSAIDs, steroid (oral)  Previous Episodes: yes  Associated Symptoms: pain, swelling, clicking/popping, decreased strength  ADLs Affected: grooming/hygiene/toileting/personal  "care, work related activities  Previous Treatment: NSAIDs       Objective   Vital Signs:   Temp 98.6 °F (37 °C)   Ht 179.1 cm (70.51\")   Wt 96.2 kg (212 lb)   BMI 29.98 kg/m²     Physical Exam  Physical Exam  Vitals signs and nursing note reviewed.   Constitutional:       Appearance: Normal appearance.   Pulmonary:      Effort: Pulmonary effort is normal.   Skin:     General: Skin is warm and dry.      Capillary Refill: Capillary refill takes less than 2 seconds.   Neurological:      General: No focal deficit present.      Mental Status: He is alert and oriented to person, place, and time. Mental status is at baseline.   Psychiatric:         Mood and Affect: Mood normal.         Behavior: Behavior normal.         Thought Content: Thought content normal.         Judgment: Judgment normal.     Ortho Exam   Right knee (varus). Patient has crepitus throughout range of motion. Positive patellar grind test. Mild effusion. Lachman is negative. Pivot shift is negative. Anterior and posterior drawer signs are negative. Significant joint line tenderness is noted on the medial aspect of the knee. Patient has a varus orientation of the knee. There is fullness and tenderness in the Popliteal fossa. Mild distention of a Popliteal cyst is noted in this location. Range of motion in flexion is from 0-100 degrees. Neurovascular status is intact.  Dorsalis pedis and posterior tibial artery pulses are palpable. Common peroneal nerve function is well preserved. Patient's gait is cautious and antalgic. Skin and soft tissues are mildly swollen, consistent with synovitis and effusion. The patient has a significant limp with the first few steps after starting the gait cycle. Getting out of a chair takes a lot of effort due to pain on knee flexion.   Left shoulder (rct). The shoulder is extremely tender anteriorly. There is tenderness over the insertion of the rotator cuff over the greater tuberosity of the humerus. Slight proximal " migration of the humeral head is noted. AC joint is tender. Crossover adduction test is positive. Drop arm sign is positive. Forward flexion is 0-100 degrees, abduction is 0-100 degrees, external rotation is 0-30 degrees. Patient has mild atrophy both of the supra and infraspinatus fossa. Sulcus sign is negative. There is no evidence of multidirectional instability. Neer test is positive on compression. Skin and soft tissue tenderness is noted. Patient's strength in abduction and external rotation are extremely lacking. The pain level is 6.          Result Review :   The following data was reviewed by: Jewel Mcclure MD on 04/08/2021:    Reviewed MRI report of Left shoulder, performed at  [facility] on 24 March 2021, summary of impression below:  MRI report is available today in the office.  These images are discussed with the patient.  There is tendinitis of the supra the infra spinatus and subscapularis.  There is a complete, retracted tear of the supraspinatus with a 2.8 cm defect.  Subchondral cystic changes are noted over the superior aspect of the humeral head.  There are tears involving the infraspinatus as well.  The subscapularis is torn.  Some degenerative changes are noted over the AC joint as well.  The changes are degeneration over the glenohumeral joint are moderate in nature.  These images are discussed with the patient and are consistent with advanced cuff tear arthropathy.  It is unlikely that a surgical repair will benefit this patient as far as the rotator cuff function is concerned.          Procedures           Assessment   Assessment and Plan    Diagnoses and all orders for this visit:    1. Traumatic tear of supraspinatus tendon of left shoulder, subsequent encounter (Primary)    2. Post-traumatic osteoarthritis of right knee          Follow Up   · Compression/brace to the knee to prevent it from buckling and giving out.  · Intra-articular steroid injection and  viscosupplementation injections to the knee discussed with the patient.  · Steroid injection to the shoulder discussed with the patient as well.  · He will eventually need total knee arthroplasty and he will let me know when he is ready for that form of intervention.  · We have also discussed about the possibility of reverse total shoulder arthroplasty based on symptom progression.  · Falls precautions and dislocation precautions.  · Calcium and vitamin D for bone health.  · Rest, ice, compression, and elevation (RICE) therapy  · Stretching and strengthening exercises  · OTC Tylenol 500-1000mg by mouth every 6 hours as needed for pain   · Follow up in 3 month(s)  • Patient was given instructions and counseling regarding his condition or for health maintenance advice. Please see specific information pulled into the AVS if appropriate.     Jewel Mcclure MD   Date of Encounter: 4/8/2021       EMR Dragon/Transcription disclaimer:  Much of this encounter note is an electronic transcription/translation of spoken language to printed text. The electronic translation of spoken language may permit erroneous, or at times, nonsensical words or phrases to be inadvertently transcribed; Although I have reviewed the note for such errors, some may still exist.

## 2021-05-13 NOTE — PROGRESS NOTES
"   Progress Note      Patient Name: Dennis Trinh   Patient ID: [id number]   Sex: Male   YOB: 1955    Primary Care Provider: Holden Stewart MD   Referring Provider: Holden Stewart MD    Visit Date: July 27, 2020    Provider: Chase Julio MD   Location: [facility]   Location Address: [address] [place] [address]  [id number]   Location Phone: [phone]          Chief Complaint  · Shortness of breath  · Mitral valve prolapse      History Of Present Illness  Dennis Trinh is a 65 year old male with history of shortness of breath. Denies any chest pain. He has some shortness of breath.   CURRENT MEDICATIONS: include Losartan 50 mg qd; ASA 81 mg qd; Singulair qd; Multivitamin qd. The dosage and frequency of the medications were reviewed with the patient.   PAST MEDICAL HISTORY: Negative for diabetes and chest pain. Positive for hypertension.   FAMILY HISTORY: Positive for diabetes, hypertension and heart disease.   PSYCHOSOCIAL HISTORY: Negative for depression or mood changes. He drinks alcohol moderately and does not smoke.       Review of Systems  · Cardiovascular  o Admits  o : shortness of breath  o Denies  o : palpitations (fast, fluttering, or skipping beats), swelling (feet, ankles, hands),chest pain or angina pectoris  · Respiratory  o Denies  o : chronic or frequent cough, asthma or wheezing      Vitals  Date Time BP Position Site L\R Cuff Size HR RR TEMP (F) WT  HT  BMI kg/m2 BSA m2 O2 Sat        07/27/2020 12:25 /68 Sitting    65 - R   218lbs 0oz 5'  10.5\" 30.84 2.22           Physical Examination  · Constitutional  o Appearance  o : Awake, alert, cooperative, pleasant. Patient is obese.   · Respiratory  o Inspection of Chest  o : No chest wall deformities, moving equal.  o Auscultation of Lungs  o : Good air entry with vesicular breath sounds.  · Cardiovascular  o Heart  o :   § Auscultation of Heart  § : S1 and S2 regular. No S3. No S4. No " murmurs.  o Peripheral Vascular System  o :   § Extremities  § : Peripheral pulses were well felt. No edema. No cyanosis.  · Gastrointestinal  o Abdominal Examination  o : No masses or organomegaly noted.          Assessment     IMPRESSION/PLAN    1. Mild to moderate mitral valve disease. Mitral stenosis. Mitral regurgitation stable.  2. Shortness of breath probably secondary to COPD.  3. Follow up with his PMD.   4. See me back in six months.    MD MARIAMA Carbajal/wt       Plan  · Instructions  o This note was transcribed by Pratibha Dickinson. MARIAMA/wt  o The above service was transcribed by Pratibha Dickinson on my behalf and I attest to the accuracy of the note. MARIAMA            Electronically Signed by: Yoli Dickinson-, -Author on July 29, 2020 09:36:30 AM  Electronically Co-signed by: Chase Julio MD -Reviewer on August 15, 2020 09:41:09 AM

## 2021-05-14 VITALS
DIASTOLIC BLOOD PRESSURE: 76 MMHG | HEART RATE: 70 BPM | HEIGHT: 70 IN | WEIGHT: 215 LBS | BODY MASS INDEX: 30.78 KG/M2 | SYSTOLIC BLOOD PRESSURE: 158 MMHG

## 2021-05-14 NOTE — PROGRESS NOTES
"   Progress Note      Patient Name: Dennis Trinh   Patient ID: [id number]   Sex: Male   YOB: 1955    Primary Care Provider: Holden Stewart MD   Referring Provider: Holden Stewart MD    Visit Date: February 1, 2021    Provider: Chase Julio MD   Location: [facility]   Location Address: [address] [place] [address]  [id number]   Location Phone: [phone]          Chief Complaint  · Shortness of breath  · Mitral valve prolapse  · Mitral stenosis  · Recent Covid      History Of Present Illness  Dennis Trinh is a 66 year old male with history of mild mitral valve disease; recent Covid. Denies any chest pain. Shortness of breath has improved.   CURRENT MEDICATIONS: include Losartan 50 mg 1 1/2 tablets qd; HCTZ 25 mg qd; ASA 81 mg qd; Adult vitamin qd; Loperamide bid. The dosage and frequency of the medications were reviewed with the patient.   PAST MEDICAL HISTORY: Negative for diabetes and chest pain. Positive for hypertension.   PSYCHOSOCIAL HISTORY: He drinks alcohol moderately and does not smoke.       Review of Systems  · Cardiovascular  o Denies  o : palpitations (fast, fluttering, or skipping beats), swelling (feet, ankles, hands),chest pain or angina pectoris; shortness of breath   · Respiratory  o Denies  o : chronic or frequent cough, asthma or wheezing      Vitals  Date Time BP Position Site L\R Cuff Size HR RR TEMP (F) WT  HT  BMI kg/m2 BSA m2 O2 Sat FR L/min FiO2 HC       02/01/2021 10:23 /76 Sitting    68 - R   214lbs 16oz 5'  10.5\" 30.41 2.2       02/01/2021 10:23 /74 Sitting    68 - R                   Physical Examination  · Constitutional  o Appearance  o : Awake, alert, cooperative, pleasant. Patient is obese.   · Respiratory  o Inspection of Chest  o : No chest wall deformities, moving equal.  o Auscultation of Lungs  o : Good air entry with vesicular breath sounds.  · Cardiovascular  o Heart  o :   § Auscultation of " Heart  § : S1 and S2 regular. No S3. No S4. No murmurs.  o Peripheral Vascular System  o :   § Extremities  § : Peripheral pulses were well felt. No edema. No cyanosis.  · Gastrointestinal  o Abdominal Examination  o : No masses or organomegaly noted.          Assessment     IMPRESSION/PLAN    1. Mild to moderate mitral valve disease. Mitral stenosis. Mitral regurgitation stable.  2. COPD.  3. Recent Covid improved.   4. Essential hypertension controlled. Continue current dose of Losartan.  5. See me back in six months.    MD MARIAMA Carbajal/wt       Plan  · Instructions  o This note was transcribed by Pratibha Dickinson. MARIAMA/wt  o The above service was transcribed by Pratibha Dickinson on my behalf and I attest to the accuracy of the note. MARIAMA            Electronically Signed by: Yoli Dickinson-, -Author on February 3, 2021 02:24:52 PM  Electronically Co-signed by: Chase Julio MD -Reviewer on February 4, 2021 09:41:20 AM

## 2021-05-15 VITALS
BODY MASS INDEX: 31.56 KG/M2 | SYSTOLIC BLOOD PRESSURE: 160 MMHG | DIASTOLIC BLOOD PRESSURE: 87 MMHG | WEIGHT: 223.12 LBS | HEART RATE: 66 BPM

## 2021-05-15 VITALS
BODY MASS INDEX: 31.64 KG/M2 | HEART RATE: 68 BPM | WEIGHT: 221 LBS | SYSTOLIC BLOOD PRESSURE: 146 MMHG | HEIGHT: 70 IN | DIASTOLIC BLOOD PRESSURE: 85 MMHG

## 2021-05-15 VITALS
HEIGHT: 70 IN | WEIGHT: 218 LBS | SYSTOLIC BLOOD PRESSURE: 139 MMHG | HEART RATE: 67 BPM | BODY MASS INDEX: 31.21 KG/M2 | DIASTOLIC BLOOD PRESSURE: 68 MMHG

## 2021-07-08 ENCOUNTER — HOSPITAL ENCOUNTER (OUTPATIENT)
Dept: GENERAL RADIOLOGY | Facility: HOSPITAL | Age: 66
Discharge: HOME | End: 2021-07-08

## 2021-07-08 ENCOUNTER — OFFICE VISIT (OUTPATIENT)
Dept: ORTHOPEDIC SURGERY | Facility: CLINIC | Age: 66
End: 2021-07-08

## 2021-07-08 VITALS — TEMPERATURE: 96.8 F | WEIGHT: 215 LBS | HEIGHT: 70 IN | BODY MASS INDEX: 30.78 KG/M2

## 2021-07-08 DIAGNOSIS — S32.591A PUBIC RAMUS FRACTURE, RIGHT, CLOSED, INITIAL ENCOUNTER (HCC): ICD-10-CM

## 2021-07-08 DIAGNOSIS — S46.812A INFRASPINATUS TENDON TEAR, LEFT, INITIAL ENCOUNTER: Primary | ICD-10-CM

## 2021-07-08 PROCEDURE — 99213 OFFICE O/P EST LOW 20 MIN: CPT | Performed by: ORTHOPAEDIC SURGERY

## 2021-07-08 PROCEDURE — 20610 DRAIN/INJ JOINT/BURSA W/O US: CPT | Performed by: ORTHOPAEDIC SURGERY

## 2021-07-08 RX ADMIN — METHYLPREDNISOLONE ACETATE 160 MG: 80 INJECTION, SUSPENSION INTRA-ARTICULAR; INTRALESIONAL; INTRAMUSCULAR; SOFT TISSUE at 16:49

## 2021-07-08 NOTE — PROGRESS NOTES
"Chief Complaint  Follow-up of the Right Knee, Follow-up of the Pelvis, and Follow-up of the Left Shoulder    Subjective    History of Present Illness      Dennis Trinh Jr. is a 66 y.o. male who presents to [facility] for left shoulder pain and injury.  He is also following up on his right hip and pelvis and his right knee.  History of Present Illness patient was injured in a Workmen's Compensation related injury 2 to 3 years ago.  He has continued to have pain in his left shoulder.  He has weakness in abduction with difficulty in cross body activities.  He has difficulty going into the overhead abducted position.  He is also had a lot of pain and discomfort in his hip, pelvis and knee.  He is dealing with the pelvic pain and nonoperatively his knee pain as well.  Today he states that his shoulder has been bothering him consistently and he wants a steroid injection to this glenohumeral joint.  Pain Location:  RIGHT knee, RIGHT hip and LEFT shoulder  Radiation: none  Quality: dull, aching  Intensity/Severity: mild-moderate  Duration: several years  Progression of symptoms: yes, progressive worsening  Onset quality: gradual   Timing: intermittent  Aggravating Factors: going up and down stairs, kneeling, rising after sitting, overhead reaching, reaching backward, reaching forward  Alleviating Factors: steroid (oral), rest, ice  Previous Episodes: yes  Associated Symptoms: pain, swelling  ADLs Affected: ambulating, work related activities, recreational activities/sports  Previous Treatment: NSAIDs and intra-articular injection       Objective   Vital Signs:   Temp 96.8 °F (36 °C)   Ht 177.8 cm (70\")   Wt 97.5 kg (215 lb)   BMI 30.85 kg/m²     Physical Exam  Physical Exam  Vitals signs and nursing note reviewed.   Constitutional:       Appearance: Normal appearance.   Pulmonary:      Effort: Pulmonary effort is normal.   Skin:     General: Skin is warm and dry.      Capillary Refill: " Capillary refill takes less than 2 seconds.   Neurological:      General: No focal deficit present.      Mental Status: He is alert and oriented to person, place, and time. Mental status is at baseline.   Psychiatric:         Mood and Affect: Mood normal.         Behavior: Behavior normal.         Thought Content: Thought content normal.         Judgment: Judgment normal.     Ortho Exam   Right knee (varus). Patient has crepitus throughout range of motion. Positive patellar grind test. Mild effusion. Lachman is negative. Pivot shift is negative. Anterior and posterior drawer signs are negative. Significant joint line tenderness is noted on the medial aspect of the knee. Patient has a varus orientation of the knee. There is fullness and tenderness in the Popliteal fossa. Mild distention of a Popliteal cyst is noted in this location. Range of motion in flexion is from 0-120 degrees. Neurovascular status is intact.  Dorsalis pedis and posterior tibial artery pulses are palpable. Common peroneal nerve function is well preserved. Patient's gait is cautious and antalgic. Skin and soft tissues are mildly swollen, consistent with synovitis and effusion. The patient has a significant limp with the first few steps after starting the gait cycle. Getting out of a chair takes a lot of effort due to pain on knee flexion.   Left shoulder (rct). The shoulder is extremely tender anteriorly. There is tenderness over the insertion of the rotator cuff over the greater tuberosity of the humerus. Slight proximal migration of the humeral head is noted. AC joint is tender. Crossover adduction test is positive. Drop arm sign is positive. Forward flexion is 0-130 degrees, abduction is 0-120 degrees, external rotation is 0-30 degrees. Patient has mild atrophy both of the supra and infraspinatus fossa. Sulcus sign is negative. There is no evidence of multidirectional instability. Neer test is positive on compression. Skin and soft tissue  tenderness is noted. Patient's strength in abduction and external rotation are extremely lacking. The pain level is 5.      Result Review :   The following data was reviewed by: Jewel Mcclure MD on 07/08/2021:  Radiologic studies - see below for interpretation    Reviewed MRI report of left shoulder, performed at  [facility] on 03/24/2021, summary of impression below:  MRI reports are discussed with the patient.  These images are available in the office today.  These images are consistent with a fully torn retracted supraspinatus tendon.  There is also a partial tear with retraction of the infraspinatus and the subscapularis.  He does have some evidence of AC joint arthritis.  I do feel that we can manage him conservatively and nonoperatively at this point but eventually with symptom progression he is noted need a reverse total shoulder arthroplasty          Large Joint Arthrocentesis: L subacromial bursa  Date/Time: 7/8/2021 4:49 PM  Consent given by: patient  Site marked: site marked  Timeout: Immediately prior to procedure a time out was called to verify the correct patient, procedure, equipment, support staff and site/side marked as required   Supporting Documentation  Indications: pain   Procedure Details  Location: shoulder - L subacromial bursa  Preparation: Patient was prepped and draped in the usual sterile fashion  Needle size: 25 G  Approach: anteromedial  Medications administered: 160 mg methylPREDNISolone acetate 80 MG/ML; 2 mL lidocaine (cardiac)  Patient tolerance: patient tolerated the procedure well with no immediate complications                 Assessment   Assessment and Plan    Diagnoses and all orders for this visit:    1. Infraspinatus tendon tear, left, initial encounter (Primary)    Other orders  -     Large Joint Arthrocentesis: L subacromial bursa          Follow Up   · Compression/brace to the knee to prevent it from buckling giving her.  · Injected patient's left  shoulder with a steroid from an anterolateral approach.  · Calcium and vitamin D for bone health.  · Continue with current work status which is now part-time.  · Discussed with the patient that he will eventually need reverse total shoulder arthroplasty based on symptom progression of the shoulder.  · Rest, ice, compression, and elevation (RICE) therapy  · Stretching and strengthening exercises of the flexors and abductors of the shoulder to prevent arthrofibrosis.  · Patient will eventually need a left reverse total shoulder arthroplasty  · OTC Alternate Ibuprofen and Tylenol as needed  · Follow up in 3 month(s)  • Patient was given instructions and counseling regarding his condition or for health maintenance advice. Please see specific information pulled into the AVS if appropriate.     Jewel Mcclure MD   Date of Encounter: 7/8/2021       EMR Dragon/Transcription disclaimer:  Much of this encounter note is an electronic transcription/translation of spoken language to printed text. The electronic translation of spoken language may permit erroneous, or at times, nonsensical words or phrases to be inadvertently transcribed; Although I have reviewed the note for such errors, some may still exist.

## 2021-07-27 RX ORDER — METHYLPREDNISOLONE ACETATE 80 MG/ML
160 INJECTION, SUSPENSION INTRA-ARTICULAR; INTRALESIONAL; INTRAMUSCULAR; SOFT TISSUE
Status: COMPLETED | OUTPATIENT
Start: 2021-07-08 | End: 2021-07-08

## 2021-08-17 ENCOUNTER — TELEPHONE (OUTPATIENT)
Dept: CARDIOLOGY | Facility: CLINIC | Age: 66
End: 2021-08-17

## 2021-08-17 DIAGNOSIS — I34.1 MITRAL VALVE PROLAPSE: Primary | ICD-10-CM

## 2021-08-17 NOTE — TELEPHONE ENCOUNTER
Patient called.    He states he is scheduled to see his PCP tomorrow and knows PCP will ask about a recent Echo.    Last Echo was June 2019.    Patient and PCP believe patient needs a follow up Echo.    Patient has a f/u appt with our office on 9/13.    Would you like Echo?  If so, please place orders.

## 2021-08-17 NOTE — TELEPHONE ENCOUNTER
Informed patient Echo has been ordered.  Advised he will be contacted to scheduled.  Patient verbalized understanding.

## 2021-09-18 NOTE — PROGRESS NOTES
[facility]  Cardiology progress Note    Patient Name: Dennis Trinh Jr.  : 1955    CHIEF COMPLAINT  Hypertension, mild valvular heart disease.      Subjective   Subjective     HISTORY OF PRESENT ILLNESS    Dennis Trinh Jr. is a 66 y.o. male history of mild valvular heart disease and hypertension. Blood pressure well controlled at home. No chest pain or shortness of breath.    Review of Systems:   Constitutional no fever,  no weight loss   Skin no rash   Otolaryngeal no difficulty swallowing   Cardiovascular See HPI   Pulmonary no cough, no sputum production   Gastrointestinal no constipation, no diarrhea   Genitourinary no dysuria, no hematuria   Hematologic no easy bruisability, no abnormal bleeding   Musculoskeletal no muscle pain   Neurologic no dizziness, no falls         Personal History     Social History:  reports that he has never smoked. He has never used smokeless tobacco. Drug use questions deferred to the physician. He reports that he does not drink alcohol.    Home Medications:  Current Outpatient Medications on File Prior to Visit   Medication Sig   • fexofenadine (ALLEGRA) 180 MG tablet    • hydrochlorothiazide (HYDRODIURIL) 25 MG tablet    • losartan (COZAAR) 50 MG tablet Take 50 mg by mouth Daily. 1 1/2 TAB DAILY   • montelukast (SINGULAIR) 10 MG tablet    • sildenafil (VIAGRA) 50 MG tablet TAKE ONE TABLET BY MOUTH TWO hours prior TO sexual activity   • amoxicillin (AMOXIL) 500 MG capsule    • Diclofenac Sodium (PENNSAID) 2 % solution Apply two pumps to the affected area twice a day prn pain   • HYDROcodone-acetaminophen (NORCO) 5-325 MG per tablet    • sildenafil (VIAGRA) 100 MG tablet 1/2 TO 1 TABLET BY MOUTH EVERY DAY AS NEEDED   • tadalafil (CIALIS) 10 MG tablet TAKE ONE TABLET BY MOUTH TWO hours prior TO sexual activity   • traMADol (ULTRAM) 50 MG tablet Take 1-2 tablets every 8 hours PRN moderate -severe pain   • traZODone (DESYREL) 50 MG tablet      No current  facility-administered medications on file prior to visit.     Allergies:  No Known Allergies    Objective    Objective       Vitals:   Heart Rate:  [68-69] 68  BP: (161-162)/(86-88) 161/88  Body mass index is 29.84 kg/m².     Physical Exam:   Constitutional: Awake, alert, No acute distress    Eyes: PERRLA, sclerae anicteric, no conjunctival injection   HENT: NCAT, mucous membranes moist   Neck: Supple, no thyromegaly, no lymphadenopathy, trachea midline   Respiratory: Clear to auscultation bilaterally, nonlabored respirations    Cardiovascular: RRR, no murmurs or rubs. Palpable pedal pulses bilaterally   Musculoskeletal: No bilateral ankle edema, no cyanosis to extremities   Psychiatric: Appropriate affect, cooperative   Neurologic: Oriented x 3, strength symmetric in all extremities, Cranial Nerves grossly intact to confrontation, speech clear   Skin: No rashes.    Result Review    Result Review:  I have personally reviewed the available results from  [x]  Laboratory  [x]  EKG  [x]  Cardiology  [x]  Medications  [x]  Old records  []  Other:   Procedures    Impression/Plan:  1. Mild to moderate mitral disease/mitral stenosis: Asymptomatic and stable.  Her cardiogram today to evaluate any progression of his valvular heart disease.  2. COPD/shortness of breath: Continue current bronchodilators.  3. Essential hypertension Uncontrolled: Continue losartan.  Increase losartan to 100 mg once a day.  Office uncontrolled blood pressure.  Continue hydrochlorothiazide.  Low-salt diet advised.           Chase Julio MD   09/20/21   10:42 EDT

## 2021-09-20 ENCOUNTER — OFFICE VISIT (OUTPATIENT)
Dept: CARDIOLOGY | Facility: CLINIC | Age: 66
End: 2021-09-20

## 2021-09-20 VITALS
HEART RATE: 68 BPM | WEIGHT: 208 LBS | SYSTOLIC BLOOD PRESSURE: 161 MMHG | BODY MASS INDEX: 29.78 KG/M2 | HEIGHT: 70 IN | DIASTOLIC BLOOD PRESSURE: 88 MMHG

## 2021-09-20 DIAGNOSIS — I10 HYPERTENSION, ESSENTIAL: ICD-10-CM

## 2021-09-20 DIAGNOSIS — I34.2 NONRHEUMATIC MITRAL VALVE STENOSIS: ICD-10-CM

## 2021-09-20 DIAGNOSIS — R06.02 SHORTNESS OF BREATH: Primary | ICD-10-CM

## 2021-09-20 PROCEDURE — 99214 OFFICE O/P EST MOD 30 MIN: CPT | Performed by: SPECIALIST

## 2021-09-20 RX ORDER — LOSARTAN POTASSIUM 100 MG/1
100 TABLET ORAL DAILY
Qty: 90 TABLET | Refills: 6 | Status: SHIPPED | OUTPATIENT
Start: 2021-09-20 | End: 2022-10-10 | Stop reason: SDUPTHER

## 2021-09-21 ENCOUNTER — TELEPHONE (OUTPATIENT)
Dept: CARDIOLOGY | Facility: CLINIC | Age: 66
End: 2021-09-21

## 2021-09-21 NOTE — TELEPHONE ENCOUNTER
----- Message from ALEC Pacheco sent at 9/20/2021 12:47 PM EDT -----  Notify pt echo shows moderate mitral stenosis/regurgitation and mild tricuspid regurgitation compared to last echo which showed mild to moderate mitral regurgitation/stenosis. Heart function is normal 59%. Keep March follow up

## 2021-10-06 ENCOUNTER — CLINICAL SUPPORT (OUTPATIENT)
Dept: ORTHOPEDIC SURGERY | Facility: CLINIC | Age: 66
End: 2021-10-06

## 2021-10-06 VITALS — BODY MASS INDEX: 29.78 KG/M2 | HEIGHT: 70 IN | WEIGHT: 208 LBS | TEMPERATURE: 97.3 F

## 2021-10-06 DIAGNOSIS — S46.812D TRAUMATIC TEAR OF SUPRASPINATUS TENDON OF LEFT SHOULDER, SUBSEQUENT ENCOUNTER: ICD-10-CM

## 2021-10-06 DIAGNOSIS — M17.31 POST-TRAUMATIC OSTEOARTHRITIS OF RIGHT KNEE: Primary | ICD-10-CM

## 2021-10-06 PROCEDURE — 20610 DRAIN/INJ JOINT/BURSA W/O US: CPT | Performed by: PHYSICIAN ASSISTANT

## 2021-10-06 RX ORDER — METHYLPREDNISOLONE ACETATE 80 MG/ML
160 INJECTION, SUSPENSION INTRA-ARTICULAR; INTRALESIONAL; INTRAMUSCULAR; SOFT TISSUE
Status: COMPLETED | OUTPATIENT
Start: 2021-10-06 | End: 2021-10-06

## 2021-10-06 RX ADMIN — METHYLPREDNISOLONE ACETATE 160 MG: 80 INJECTION, SUSPENSION INTRA-ARTICULAR; INTRALESIONAL; INTRAMUSCULAR; SOFT TISSUE at 14:01

## 2021-10-06 NOTE — PROGRESS NOTES
"Chief Complaint  Follow-up and Pain of the Left Shoulder, Follow-up and Pain of the Right Knee, and Injections    Subjective    History of Present Illness      Dennis Trinh Jr. is a 66 y.o. male who presents to [facility] for is here today for injection therapy. He receives  RIGHT knee and LEFT shoulder injections of Depo-Medrol. Since last injection, patient notes substantial relief of symptoms.  His last injection of the right knee was in October 2020.  Treatment options have been discussed and he understands and consents.       Objective   Vital Signs:   Temp 97.3 °F (36.3 °C)   Ht 177.8 cm (70\")   Wt 94.3 kg (208 lb)   BMI 29.84 kg/m²     Physical Exam  66 y.o. male is awake, alert, oriented, in no acute distress and well developed, well nourished.  Ortho Exam   RIGHT knee  There is Mild joint line tenderness at the medial aspect of the knee.   Positive for varus orientation of the knee.   Positive for crepitus throughout range of motion.   Negative for effusion.  Positive patellar grind test.   Negative Lachman test.    Negative anterior and posterior drawer.  Range of motion in extension and flexion is: 0-115 degrees.  Neurovascular status is intact.    Dorsalis pedis and posterior tibial artery pulses are palpable.    Common peroneal nerve function is well preserved.  Gait is cautious and antalgic.       LEFT shoulder  Positive for significant tenderness at the anterior aspect of the shoulder and at the insertion of the rotator cuff over the greater tuberosity of the humerus.   Positive for tenderness with palpation of the AC joint.  Soft tissue tenderness is noted.   Slight proximal migration of the humeral head is noted.   Forward flexion is 0-.30 degrees, abduction is 0-30 degrees, external rotation is 0-30 degrees.   Positive for decreased strength in abduction and external rotation.   Crossover adduction test is positive.    Drop arm sign is positive.  Sulcus sign is " negative.    Neer test is positive on compression.  The pain level is 6.  There is no evidence of multidirectional instability.    Result Review :         PROCEDURE  Large Joint Arthrocentesis: R knee  Date/Time: 10/6/2021 2:01 PM  Consent given by: patient  Site marked: site marked  Timeout: Immediately prior to procedure a time out was called to verify the correct patient, procedure, equipment, support staff and site/side marked as required   Supporting Documentation  Indications: pain and joint swelling   Procedure Details  Location: knee - R knee  Preparation: Patient was prepped and draped in the usual sterile fashion  Needle size: 25 G  Approach: anteromedial  Medications administered: 160 mg methylPREDNISolone acetate 80 MG/ML; 2 mL lidocaine (cardiac)  Patient tolerance: patient tolerated the procedure well with no immediate complications    Large Joint Arthrocentesis: L glenohumeral  Date/Time: 10/6/2021 2:01 PM  Consent given by: patient  Site marked: site marked  Timeout: Immediately prior to procedure a time out was called to verify the correct patient, procedure, equipment, support staff and site/side marked as required   Supporting Documentation  Indications: pain and joint swelling   Procedure Details  Location: shoulder - L glenohumeral  Preparation: Patient was prepped and draped in the usual sterile fashion  Needle size: 25 G  Approach: anterolateral  Medications administered: 160 mg methylPREDNISolone acetate 80 MG/ML; 2 mL lidocaine (cardiac)  Patient tolerance: patient tolerated the procedure well with no immediate complications        Injection site was identified by physical examination and cleaned with Betadine and alcohol swabs. Prior to needle insertion, ethyl chloride spray was used for surface anesthesia. Sterile technique was used.       Assessment   Assessment and Plan    Problem List Items Addressed This Visit        Musculoskeletal and Injuries    Post-traumatic osteoarthritis of  right knee - Primary    Relevant Orders    Large Joint Arthrocentesis: R knee    Traumatic tear of supraspinatus tendon of left shoulder    Relevant Orders    Large Joint Arthrocentesis: L glenohumeral          Follow Up   • Right knee Depo-Medrol injection was discussed with the patient. Discussed indication, risks, benefits, and alternatives. Verbal consent was given to proceed with the procedure.   • Injection was performed from anteromedial approach.  Left shoulder Depo-Medrol injection was discussed with the patient and administered from a anteromedial approach. Patient tolerated the procedure well and no complications were encountered.  • Discussion of orthopedic goals and activities and patient/guardian expressed understanding.  • Ice, heat, rest, compression and elevation of extremity as beneficial  • nsaids and/or tylenol as beneficial  • Instructed to refrain from heavy activity/rest the extremity for the next 24-48 hours  • Discussion regarding possibility of cortisol flare and what to expect if this occurs  • Watch for signs and symptoms of infection  • Call if adverse effect from injection therapy  • Follow up in 3 months  • Patient was given instructions and counseling regarding his condition or for health maintenance advice. Please see specific information pulled into the AVS if appropriate.     Kirt Guthrie PA-C   Date of Encounter: 10/6/2021   Electronically signed by Kirt Guthrie PA-C, 10/06/21, 2:43 PM EDT.      EMR Dragon/Transcription disclaimer:  Much of this encounter note is an electronic transcription/translation of spoken language to printed text. The electronic translation of spoken language may permit erroneous, or at times, nonsensical words or phrases to be inadvertently transcribed; Although I have reviewed the note for such errors, some may still exist.

## 2021-12-17 ENCOUNTER — TELEPHONE (OUTPATIENT)
Dept: CARDIOLOGY | Facility: CLINIC | Age: 66
End: 2021-12-17

## 2021-12-17 DIAGNOSIS — I05.0 MITRAL VALVE STENOSIS, UNSPECIFIED ETIOLOGY: ICD-10-CM

## 2021-12-17 DIAGNOSIS — R06.02 SOB (SHORTNESS OF BREATH) ON EXERTION: Primary | ICD-10-CM

## 2021-12-17 RX ORDER — FUROSEMIDE 20 MG/1
TABLET ORAL
Qty: 5 TABLET | Refills: 0 | Status: SHIPPED | OUTPATIENT
Start: 2021-12-17 | End: 2021-12-30

## 2021-12-17 NOTE — TELEPHONE ENCOUNTER
Received VM from patient/     SW patient. Patient having bilateral ankle edema and SOA with activity. Confirmed patient takes HCTC 25 mg daily.

## 2021-12-17 NOTE — TELEPHONE ENCOUNTER
HILLARY Cornejo. Per Chantal patient to take Lasix 20 mg for 5 days. Have BMP and BNP checked in 1 week.    Patient notified.

## 2021-12-20 ENCOUNTER — CLINICAL SUPPORT (OUTPATIENT)
Dept: ORTHOPEDIC SURGERY | Facility: CLINIC | Age: 66
End: 2021-12-20

## 2021-12-20 VITALS — HEIGHT: 70 IN | BODY MASS INDEX: 31.21 KG/M2 | TEMPERATURE: 96.6 F | WEIGHT: 218 LBS

## 2021-12-20 DIAGNOSIS — M17.31 POST-TRAUMATIC OSTEOARTHRITIS OF RIGHT KNEE: ICD-10-CM

## 2021-12-20 DIAGNOSIS — M70.61 GREATER TROCHANTERIC BURSITIS OF RIGHT HIP: Primary | ICD-10-CM

## 2021-12-20 PROCEDURE — 20610 DRAIN/INJ JOINT/BURSA W/O US: CPT | Performed by: PHYSICIAN ASSISTANT

## 2021-12-20 RX ORDER — TRIAMCINOLONE ACETONIDE 40 MG/ML
80 INJECTION, SUSPENSION INTRA-ARTICULAR; INTRAMUSCULAR
Status: COMPLETED | OUTPATIENT
Start: 2021-12-20 | End: 2021-12-20

## 2021-12-20 RX ADMIN — TRIAMCINOLONE ACETONIDE 80 MG: 40 INJECTION, SUSPENSION INTRA-ARTICULAR; INTRAMUSCULAR at 15:38

## 2021-12-20 NOTE — PROGRESS NOTES
"Chief Complaint  Injections of the Right Hip and Injections of the Right Knee    Subjective    History of Present Illness      Dennis Trinh Jr. is a 66 y.o. male who presents to [facility] for is here today for injection therapy. He receives  RIGHT knee and RIGHT hip injections of steroid. Since last injection, patient notes substantial relief of symptoms.  His last injection of the right knee was in October 2021 and last hip injection in October 2020.  He reports significant discomfort in both the right knee and the right hip.  Treatment options have been discussed and he understands and consents.       Objective   Vital Signs:   Temp 96.6 °F (35.9 °C) (Temporal)   Ht 177.8 cm (70\")   Wt 98.9 kg (218 lb)   BMI 31.28 kg/m²     Physical Exam  66 y.o. male is awake, alert, oriented, in no acute distress and well developed, well nourished.  Ortho Exam   RIGHT knee  There is Mild joint line tenderness at the medial aspect of the knee.   Positive for varus orientation of the knee.   Positive for crepitus throughout range of motion.   Negative for effusion.  Positive patellar grind test.   Negative Lachman test.    Negative anterior and posterior drawer.  Range of motion in extension and flexion is: 0-115 degrees.  Neurovascular status is intact.    Dorsalis pedis and posterior tibial artery pulses are palpable.    Common peroneal nerve function is well preserved.  Gait is cautious and antalgic.     RIGHT hip  There is tenderness with palpation over the greater trochanteric bursa.   There is tenderness with palpation of the IT band.  Cross body adduction is positive.   Positive for pain over the greater trochanter with internal and external rotation.   There is no clinical deformity and no shortening of extremity.   He does have a limp upon walking.   There is piriformis tightness.   Dorsalis pedis and posterior tibial artery pulses are palpable.   Neurovascular status is intact and " capillary refill is less than 2 seconds.   Common peroneal nerve function is well preserved.           PROCEDURE  Large Joint Arthrocentesis: R knee  Date/Time: 12/20/2021 3:38 PM  Consent given by: patient  Site marked: site marked  Timeout: Immediately prior to procedure a time out was called to verify the correct patient, procedure, equipment, support staff and site/side marked as required   Supporting Documentation  Indications: pain   Procedure Details  Location: knee - R knee  Preparation: Patient was prepped and draped in the usual sterile fashion  Needle size: 25 G  Approach: anteromedial  Medications administered: 80 mg triamcinolone acetonide 40 MG/ML; 2 mL lidocaine (cardiac)  Patient tolerance: patient tolerated the procedure well with no immediate complications    Large Joint Arthrocentesis: R greater trochanteric bursa  Date/Time: 12/20/2021 3:38 PM  Consent given by: patient  Site marked: site marked  Timeout: Immediately prior to procedure a time out was called to verify the correct patient, procedure, equipment, support staff and site/side marked as required   Supporting Documentation  Indications: pain   Procedure Details  Location: hip - R greater trochanteric bursa  Preparation: Patient was prepped and draped in the usual sterile fashion  Needle size: 22 G  Approach: lateral  Medications administered: 80 mg triamcinolone acetonide 40 MG/ML; 2 mL lidocaine (cardiac)  Patient tolerance: patient tolerated the procedure well with no immediate complications        Injection site was identified by physical examination and cleaned with Betadine and alcohol swabs. Prior to needle insertion, ethyl chloride spray was used for surface anesthesia. Sterile technique was used.       Assessment   Assessment and Plan    Problem List Items Addressed This Visit        Musculoskeletal and Injuries    Post-traumatic osteoarthritis of right knee    Relevant Orders    Large Joint Arthrocentesis: R knee    Greater  trochanteric bursitis of right hip - Primary    Relevant Orders    Large Joint Arthrocentesis: R greater trochanteric bursa          Follow Up   • Right knee and right greater trochanteric bursa Kenalog injection was discussed with the patient. Discussed indication, risks, benefits, and alternatives. Verbal consent was given to proceed with the procedure. Patient tolerated the procedure well and no complications were encountered.  • Discussion of orthopedic goals and activities and patient/guardian expressed understanding.  • Ice, heat, rest, compression and elevation of extremity as beneficial  • nsaids and/or tylenol as beneficial  • Instructed to refrain from heavy activity/rest the extremity for the next 24-48 hours  • Discussion regarding possibility of cortisol flare and what to expect if this occurs  • Watch for signs and symptoms of infection  • Call if adverse effect from injection therapy  • Follow up in 3 months.  He will call if he needs injections in the shoulder sooner.    • Patient was given instructions and counseling regarding his condition or for health maintenance advice. Please see specific information pulled into the AVS if appropriate.     Kirt Guthrie PA-C   Date of Encounter: 12/20/2021   Electronically signed by Kirt Guthrie PA-C, 12/20/21, 6:19 PM EST.      EMR Dragon/Transcription disclaimer:  Much of this encounter note is an electronic transcription/translation of spoken language to printed text. The electronic translation of spoken language may permit erroneous, or at times, nonsensical words or phrases to be inadvertently transcribed; Although I have reviewed the note for such errors, some may still exist.

## 2021-12-23 ENCOUNTER — LAB (OUTPATIENT)
Dept: LAB | Facility: HOSPITAL | Age: 66
End: 2021-12-23

## 2021-12-23 DIAGNOSIS — I05.0 MITRAL VALVE STENOSIS, UNSPECIFIED ETIOLOGY: ICD-10-CM

## 2021-12-23 DIAGNOSIS — R06.02 SOB (SHORTNESS OF BREATH) ON EXERTION: ICD-10-CM

## 2021-12-23 LAB
ANION GAP SERPL CALCULATED.3IONS-SCNC: 13 MMOL/L (ref 5–15)
BUN SERPL-MCNC: 21 MG/DL (ref 8–23)
BUN/CREAT SERPL: 19.4 (ref 7–25)
CALCIUM SPEC-SCNC: 9.4 MG/DL (ref 8.6–10.5)
CHLORIDE SERPL-SCNC: 103 MMOL/L (ref 98–107)
CO2 SERPL-SCNC: 26 MMOL/L (ref 22–29)
CREAT SERPL-MCNC: 1.08 MG/DL (ref 0.76–1.27)
GFR SERPL CREATININE-BSD FRML MDRD: 68 ML/MIN/1.73
GLUCOSE SERPL-MCNC: 121 MG/DL (ref 65–99)
POTASSIUM SERPL-SCNC: 3.9 MMOL/L (ref 3.5–5.2)
SODIUM SERPL-SCNC: 142 MMOL/L (ref 136–145)

## 2021-12-23 PROCEDURE — 36415 COLL VENOUS BLD VENIPUNCTURE: CPT

## 2021-12-23 PROCEDURE — 83880 ASSAY OF NATRIURETIC PEPTIDE: CPT

## 2021-12-23 PROCEDURE — 80048 BASIC METABOLIC PNL TOTAL CA: CPT

## 2021-12-24 LAB — NT-PROBNP SERPL-MCNC: 2199 PG/ML (ref 0–900)

## 2021-12-30 RX ORDER — FUROSEMIDE 20 MG/1
20 TABLET ORAL 2 TIMES DAILY
Qty: 10 TABLET | Refills: 0 | Status: SHIPPED | OUTPATIENT
Start: 2021-12-30 | End: 2022-01-14

## 2021-12-30 NOTE — TELEPHONE ENCOUNTER
BNP elevated and normal renal fucntion. Increase Lasix to 20 mg twice daily for 5 days, repeat BMP and BNP in 1 week. Advise compression socks, fluid restriction (no more than 2L/day) and low sodium diet

## 2021-12-30 NOTE — TELEPHONE ENCOUNTER
Received VM from patient. Patient had BNP and BMP completed. They are under labs ready for review. Patient still c/o BLE edema and SOA.

## 2022-01-10 ENCOUNTER — TELEPHONE (OUTPATIENT)
Dept: CARDIOLOGY | Facility: CLINIC | Age: 67
End: 2022-01-10

## 2022-01-10 ENCOUNTER — LAB (OUTPATIENT)
Dept: LAB | Facility: HOSPITAL | Age: 67
End: 2022-01-10

## 2022-01-10 DIAGNOSIS — I05.0 MITRAL VALVE STENOSIS, UNSPECIFIED ETIOLOGY: ICD-10-CM

## 2022-01-10 DIAGNOSIS — R06.02 SOB (SHORTNESS OF BREATH) ON EXERTION: ICD-10-CM

## 2022-01-10 LAB
ANION GAP SERPL CALCULATED.3IONS-SCNC: 9.8 MMOL/L (ref 5–15)
BUN SERPL-MCNC: 19 MG/DL (ref 8–23)
BUN/CREAT SERPL: 16.1 (ref 7–25)
CALCIUM SPEC-SCNC: 9.2 MG/DL (ref 8.6–10.5)
CHLORIDE SERPL-SCNC: 102 MMOL/L (ref 98–107)
CO2 SERPL-SCNC: 29.2 MMOL/L (ref 22–29)
CREAT SERPL-MCNC: 1.18 MG/DL (ref 0.76–1.27)
GFR SERPL CREATININE-BSD FRML MDRD: 61 ML/MIN/1.73
GLUCOSE SERPL-MCNC: 104 MG/DL (ref 65–99)
NT-PROBNP SERPL-MCNC: 2231 PG/ML (ref 0–900)
POTASSIUM SERPL-SCNC: 3.8 MMOL/L (ref 3.5–5.2)
SODIUM SERPL-SCNC: 141 MMOL/L (ref 136–145)

## 2022-01-10 PROCEDURE — 36415 COLL VENOUS BLD VENIPUNCTURE: CPT

## 2022-01-10 PROCEDURE — 83880 ASSAY OF NATRIURETIC PEPTIDE: CPT

## 2022-01-10 PROCEDURE — 80048 BASIC METABOLIC PNL TOTAL CA: CPT

## 2022-01-10 NOTE — TELEPHONE ENCOUNTER
Received VM from patient regarding if needed to still go get blood work.    SW patient Advised still need to get blood work. Patient advised will have done today.

## 2022-01-13 ENCOUNTER — TELEPHONE (OUTPATIENT)
Dept: CARDIOLOGY | Facility: CLINIC | Age: 67
End: 2022-01-13

## 2022-01-13 DIAGNOSIS — R06.02 SOB (SHORTNESS OF BREATH) ON EXERTION: Primary | ICD-10-CM

## 2022-01-13 NOTE — TELEPHONE ENCOUNTER
Received VM from patient. Patient with feet edema. Per patient cannot put shoes on. Patient has been treated by Chantal in December with Lasix 20 mg x5 days and Lasix 20 mg BID x 5days. Patient BNP 2231 on Monday. Patient does take HCTZ 25 mg daily.     Please advise

## 2022-01-14 RX ORDER — FUROSEMIDE 20 MG/1
20 TABLET ORAL DAILY
Qty: 90 TABLET | Refills: 3 | Status: SHIPPED | OUTPATIENT
Start: 2022-01-14 | End: 2022-02-18

## 2022-01-14 NOTE — TELEPHONE ENCOUNTER
Received following from Dr. Julio:  Start Lasix 20 mg once a day.  Check BNP and BMP in 2 weeks.     S/W patient and made aware of Dr. Julio's recommendations.

## 2022-02-02 ENCOUNTER — TELEPHONE (OUTPATIENT)
Dept: CARDIOLOGY | Facility: CLINIC | Age: 67
End: 2022-02-02

## 2022-02-02 DIAGNOSIS — R00.2 PALPITATIONS: Primary | ICD-10-CM

## 2022-02-02 RX ORDER — METOPROLOL SUCCINATE 25 MG/1
25 TABLET, EXTENDED RELEASE ORAL DAILY
Qty: 90 TABLET | Refills: 3 | Status: SHIPPED | OUTPATIENT
Start: 2022-02-02 | End: 2022-02-25

## 2022-02-02 NOTE — TELEPHONE ENCOUNTER
Received VM from patient    SW patient. Patient states his pcp faxed some test for Dr Julio to review. Advised not received them yet. Once Dr Julio reviewed we would give him a call.

## 2022-02-02 NOTE — TELEPHONE ENCOUNTER
Labs, EKG, and chest xray received and looked over by Chantal. Per Chantal starting patient on eliquis, metoprolol, and doing 24 hour holter.     Patient updated. All questions answered.

## 2022-02-17 ENCOUNTER — LAB (OUTPATIENT)
Dept: LAB | Facility: HOSPITAL | Age: 67
End: 2022-02-17

## 2022-02-17 DIAGNOSIS — R06.02 SOB (SHORTNESS OF BREATH) ON EXERTION: ICD-10-CM

## 2022-02-17 LAB
ANION GAP SERPL CALCULATED.3IONS-SCNC: 9.2 MMOL/L (ref 5–15)
BUN SERPL-MCNC: 15 MG/DL (ref 8–23)
BUN/CREAT SERPL: 11.8 (ref 7–25)
CALCIUM SPEC-SCNC: 9.4 MG/DL (ref 8.6–10.5)
CHLORIDE SERPL-SCNC: 103 MMOL/L (ref 98–107)
CO2 SERPL-SCNC: 30.8 MMOL/L (ref 22–29)
CREAT SERPL-MCNC: 1.27 MG/DL (ref 0.76–1.27)
GFR SERPL CREATININE-BSD FRML MDRD: 56 ML/MIN/1.73
GLUCOSE SERPL-MCNC: 137 MG/DL (ref 65–99)
NT-PROBNP SERPL-MCNC: 2513 PG/ML (ref 0–900)
POTASSIUM SERPL-SCNC: 3.8 MMOL/L (ref 3.5–5.2)
SODIUM SERPL-SCNC: 143 MMOL/L (ref 136–145)

## 2022-02-17 PROCEDURE — 83880 ASSAY OF NATRIURETIC PEPTIDE: CPT

## 2022-02-17 PROCEDURE — 80048 BASIC METABOLIC PNL TOTAL CA: CPT

## 2022-02-17 PROCEDURE — 36415 COLL VENOUS BLD VENIPUNCTURE: CPT

## 2022-02-18 ENCOUNTER — TELEPHONE (OUTPATIENT)
Dept: CARDIOLOGY | Facility: CLINIC | Age: 67
End: 2022-02-18

## 2022-02-18 ENCOUNTER — TELEPHONE (OUTPATIENT)
Dept: ORTHOPEDIC SURGERY | Facility: CLINIC | Age: 67
End: 2022-02-18

## 2022-02-18 DIAGNOSIS — R06.02 SOB (SHORTNESS OF BREATH) ON EXERTION: ICD-10-CM

## 2022-02-18 DIAGNOSIS — I10 HYPERTENSION, ESSENTIAL: Primary | ICD-10-CM

## 2022-02-18 RX ORDER — FUROSEMIDE 40 MG/1
40 TABLET ORAL DAILY
Qty: 90 TABLET | Refills: 2 | Status: SHIPPED | OUTPATIENT
Start: 2022-02-18 | End: 2022-03-21 | Stop reason: SDUPTHER

## 2022-02-18 RX ORDER — POTASSIUM CHLORIDE 750 MG/1
10 TABLET, FILM COATED, EXTENDED RELEASE ORAL DAILY
Qty: 90 TABLET | Refills: 2 | Status: SHIPPED | OUTPATIENT
Start: 2022-02-18 | End: 2022-07-15 | Stop reason: SDUPTHER

## 2022-02-18 NOTE — TELEPHONE ENCOUNTER
Provider: QUINN JONES PA-C  Caller: KINGS AVILA JR  Relationship to Patient: PATIENT  Phone Number: [phone]  Reason for Call: WOULD LIKE TO RESCHEDULE INJ. L SHOULDER  HAS CONFLICTING APPOINTMENT ON 03/21/22  THANK YOU

## 2022-02-18 NOTE — TELEPHONE ENCOUNTER
----- Message from ALEC Pacheco sent at 2/18/2022  8:33 AM EST -----  BNP is still elevated  Increase lasix to 40 mg daily  Encourage fluid restriction (no more than 2L/day) and low sodium diet  Encourage use of compression socks for edema  Recheck BMP and BNP in 2 weeks

## 2022-02-18 NOTE — PROGRESS NOTES
BNP is still elevated  Increase lasix to 40 mg daily  Encourage fluid restriction (no more than 2L/day) and low sodium diet  Encourage use of compression socks for edema  Recheck BMP and BNP in 2 weeks

## 2022-02-25 ENCOUNTER — TELEPHONE (OUTPATIENT)
Dept: CARDIOLOGY | Facility: CLINIC | Age: 67
End: 2022-02-25

## 2022-02-25 RX ORDER — METOPROLOL SUCCINATE 50 MG/1
50 TABLET, EXTENDED RELEASE ORAL DAILY
Qty: 90 TABLET | Refills: 3 | Status: SHIPPED | OUTPATIENT
Start: 2022-02-25 | End: 2023-02-22

## 2022-02-25 NOTE — TELEPHONE ENCOUNTER
Per Chantal:  Notify pt holter result: Predominant rhythm was atrial fibrillation.  Maximal heart rate 160. Minimum heart rate 74. Average heart rate of 102.  Continue eliquis. Increase metoprolol dose from 25 mg daily to 50 mg daily for better rate control. Keep March follow up    SW patient regarding results. Voiced understanding. All questions answered

## 2022-03-09 ENCOUNTER — PATIENT MESSAGE (OUTPATIENT)
Dept: CARDIOLOGY | Facility: CLINIC | Age: 67
End: 2022-03-09

## 2022-03-10 ENCOUNTER — LAB (OUTPATIENT)
Dept: LAB | Facility: HOSPITAL | Age: 67
End: 2022-03-10

## 2022-03-10 DIAGNOSIS — I10 HYPERTENSION, ESSENTIAL: ICD-10-CM

## 2022-03-10 DIAGNOSIS — R06.02 SOB (SHORTNESS OF BREATH) ON EXERTION: ICD-10-CM

## 2022-03-10 LAB
ANION GAP SERPL CALCULATED.3IONS-SCNC: 10.5 MMOL/L (ref 5–15)
BUN SERPL-MCNC: 19 MG/DL (ref 8–23)
BUN/CREAT SERPL: 15.7 (ref 7–25)
CALCIUM SPEC-SCNC: 9.6 MG/DL (ref 8.6–10.5)
CHLORIDE SERPL-SCNC: 101 MMOL/L (ref 98–107)
CO2 SERPL-SCNC: 31.5 MMOL/L (ref 22–29)
CREAT SERPL-MCNC: 1.21 MG/DL (ref 0.76–1.27)
EGFRCR SERPLBLD CKD-EPI 2021: 64.8 ML/MIN/1.73
GLUCOSE SERPL-MCNC: 110 MG/DL (ref 65–99)
NT-PROBNP SERPL-MCNC: 2564 PG/ML (ref 0–900)
POTASSIUM SERPL-SCNC: 3.8 MMOL/L (ref 3.5–5.2)
SODIUM SERPL-SCNC: 143 MMOL/L (ref 136–145)

## 2022-03-10 PROCEDURE — 36415 COLL VENOUS BLD VENIPUNCTURE: CPT

## 2022-03-10 PROCEDURE — 80048 BASIC METABOLIC PNL TOTAL CA: CPT

## 2022-03-10 PROCEDURE — 83880 ASSAY OF NATRIURETIC PEPTIDE: CPT

## 2022-03-11 ENCOUNTER — TELEPHONE (OUTPATIENT)
Dept: CARDIOLOGY | Facility: CLINIC | Age: 67
End: 2022-03-11

## 2022-03-11 DIAGNOSIS — R06.02 SHORTNESS OF BREATH: ICD-10-CM

## 2022-03-11 DIAGNOSIS — I10 HYPERTENSION, ESSENTIAL: Primary | ICD-10-CM

## 2022-03-11 NOTE — TELEPHONE ENCOUNTER
HILLARY patient. Patient states he is still having some SOA. Instructed patient to take Lasix 80 mg x5 days then back to 40 mg and to have blood work in 2 weeks per Chantal. Patient voiced understanding.

## 2022-03-11 NOTE — TELEPHONE ENCOUNTER
----- Message from ALEC Pacheco sent at 3/11/2022  8:35 AM EST -----  Renal function is good, BNP still elevated. If patient still having edema/shortness of breath then he can increase lasix from 40 mg daily to 80 mg daily for 5 days with repeat labs in 2 weeks (BMP and BNP). If symptoms have improved then continue lasix 40 mg daily

## 2022-03-14 NOTE — PROGRESS NOTES
[facility]  Cardiology progress Note    Patient Name: Dennis Trinh Jr.  : 1955    CHIEF COMPLAINT  Atrial fibrillation.      Subjective   Subjective     HISTORY OF PRESENT ILLNESS    Dennis Trinh Jr. is a 67 y.o. male with history of atrial fibrillation /hypertension.  Palpitations have improved.  Has had increased shortness of breath for the last couple of months.  Which improved with increasing Lasix.  Review of Systems:   Constitutional no fever,  no weight loss   Skin no rash   Otolaryngeal no difficulty swallowing   Cardiovascular See HPI   Pulmonary no cough, no sputum production   Gastrointestinal no constipation, no diarrhea   Genitourinary no dysuria, no hematuria   Hematologic no easy bruisability, no abnormal bleeding   Musculoskeletal no muscle pain   Neurologic no dizziness, no falls         Personal History     Social History:  reports that he has never smoked. He has never used smokeless tobacco. Drug use questions deferred to the physician. He reports that he does not drink alcohol.    Home Medications:  Current Outpatient Medications on File Prior to Visit   Medication Sig   • apixaban (ELIQUIS) 5 MG tablet tablet Take 1 tablet by mouth 2 (Two) Times a Day.   • furosemide (LASIX) 40 MG tablet Take 1 tablet by mouth Daily. (Patient taking differently: Take 40 mg by mouth Daily. Had been on 80 mg for 5 days about 3 days ago and go back to 40 per Chantal Parish and do labs, not done yet)   • hydrochlorothiazide (HYDRODIURIL) 25 MG tablet    • losartan (COZAAR) 100 MG tablet Take 1 tablet by mouth Daily. 1 1/2 TAB DAILY (Patient taking differently: Take 50 mg by mouth 2 (Two) Times a Day.)   • metoprolol succinate XL (TOPROL-XL) 50 MG 24 hr tablet Take 1 tablet by mouth Daily.   • potassium chloride 10 MEQ CR tablet Take 1 tablet by mouth Daily.   • sildenafil (VIAGRA) 50 MG tablet TAKE ONE TABLET BY MOUTH TWO hours prior TO sexual activity   • [DISCONTINUED] sildenafil (VIAGRA)  100 MG tablet 1/2 TO 1 TABLET BY MOUTH EVERY DAY AS NEEDED   • fexofenadine (ALLEGRA) 180 MG tablet    • [DISCONTINUED] amoxicillin (AMOXIL) 500 MG capsule    • [DISCONTINUED] Diclofenac Sodium (PENNSAID) 2 % solution Apply two pumps to the affected area twice a day prn pain   • [DISCONTINUED] HYDROcodone-acetaminophen (NORCO) 5-325 MG per tablet    • [DISCONTINUED] montelukast (SINGULAIR) 10 MG tablet    • [DISCONTINUED] tadalafil (CIALIS) 10 MG tablet TAKE ONE TABLET BY MOUTH TWO hours prior TO sexual activity   • [DISCONTINUED] traMADol (ULTRAM) 50 MG tablet Take 1-2 tablets every 8 hours PRN moderate -severe pain   • [DISCONTINUED] traZODone (DESYREL) 50 MG tablet      No current facility-administered medications on file prior to visit.     Allergies:  No Known Allergies    Objective    Objective       Vitals:   Heart Rate:  [89] 89  BP: (126)/(78) 126/78  Body mass index is 29.7 kg/m².     Physical Exam:   Constitutional: Awake, alert, No acute distress    Eyes: PERRLA, sclerae anicteric, no conjunctival injection   HENT: NCAT, mucous membranes moist   Neck: Supple, no thyromegaly, no lymphadenopathy, trachea midline   Respiratory: Clear to auscultation bilaterally, nonlabored respirations    Cardiovascular: RRR, DM/SM GR 3/6. Palpable pedal pulses bilaterally   Musculoskeletal: No bilateral ankle edema, no cyanosis to extremities   Psychiatric: Appropriate affect, cooperative   Neurologic: Oriented x 3, strength symmetric in all extremities, Cranial Nerves grossly intact to confrontation, speech clear   Skin: No rashes.    Result Review    Result Review:  I have personally reviewed the available results from  [x]  Laboratory  [x]  EKG  [x]  Cardiology  [x]  Medications  [x]  Old records  []  Other:     ECG 12 Lead    Date/Time: 3/21/2022 11:59 AM  Performed by: Chase Julio MD  Authorized by: Chase Julio MD   Comparison: compared with previous ECG   Similar to previous ECG  Rhythm: atrial  fibrillation  QRS axis: right  Other findings: non-specific ST-T wave changes    Clinical impression: abnormal EKG             Impression/Plan:  1.  Persistent atrial fibrillation controlled heart rate: Continue Eliquis 5 mg twice daily for stroke prevention  Continue Toprol-XL 50 mg a day for rate control.  2.  Chronic diastolic heart failure stable: Increase Lasix to 40 mg twice daily.  Check BMP in a month.  Repeat echocardiogram to evaluate any progression of valvular heart disease.  Low-salt diet fluid restriction advised.  3.  Moderate mitral regurgitation/mitral stenosis: Worsening shortness of breath.  Repeat echocardiogram to evaluate any progression of his valvular heart disease.        Chase Julio MD   03/21/22   11:51 EDT

## 2022-03-21 ENCOUNTER — OFFICE VISIT (OUTPATIENT)
Dept: CARDIOLOGY | Facility: CLINIC | Age: 67
End: 2022-03-21

## 2022-03-21 VITALS
WEIGHT: 207 LBS | BODY MASS INDEX: 29.63 KG/M2 | DIASTOLIC BLOOD PRESSURE: 78 MMHG | SYSTOLIC BLOOD PRESSURE: 126 MMHG | HEART RATE: 89 BPM | HEIGHT: 70 IN

## 2022-03-21 DIAGNOSIS — I10 HYPERTENSION, ESSENTIAL: ICD-10-CM

## 2022-03-21 DIAGNOSIS — I34.0 NONRHEUMATIC MITRAL VALVE REGURGITATION: Primary | ICD-10-CM

## 2022-03-21 DIAGNOSIS — R06.02 SHORTNESS OF BREATH: ICD-10-CM

## 2022-03-21 DIAGNOSIS — I05.0 MITRAL VALVE STENOSIS, UNSPECIFIED ETIOLOGY: ICD-10-CM

## 2022-03-21 PROCEDURE — 99214 OFFICE O/P EST MOD 30 MIN: CPT | Performed by: SPECIALIST

## 2022-03-21 PROCEDURE — 93000 ELECTROCARDIOGRAM COMPLETE: CPT | Performed by: SPECIALIST

## 2022-03-21 RX ORDER — FUROSEMIDE 40 MG/1
40 TABLET ORAL 2 TIMES DAILY
Qty: 180 TABLET | Refills: 2 | Status: SHIPPED | OUTPATIENT
Start: 2022-03-21 | End: 2022-08-04 | Stop reason: SDUPTHER

## 2022-03-21 NOTE — PATIENT INSTRUCTIONS
Heart Failure, Diagnosis    Heart failure means that your heart is not able to pump blood in the right way. This makes it hard for your body to work well. Heart failure is usually a long-term (chronic) condition. You must take good care of yourself and follow your treatment plan from your doctor.  What are the causes?  This condition may be caused by:  High blood pressure.  Build up of cholesterol and fat in the arteries.  Heart attack. This injures the heart muscle.  Heart valves that do not open and close properly.  Damage of the heart muscle. This is also called cardiomyopathy.  Lung disease.  Abnormal heart rhythms.  What increases the risk?  The risk of heart failure goes up as a person ages. This condition is also more likely to develop in people who:  Are overweight.  Are male.  Smoke or chew tobacco.  Abuse alcohol or illegal drugs.  Have taken medicines that can damage the heart.  Have diabetes.  Have abnormal heart rhythms.  Have thyroid problems.  Have low blood counts (anemia).  What are the signs or symptoms?  Symptoms of this condition include:  Shortness of breath.  Coughing.  Swelling of the feet, ankles, legs, or belly.  Losing weight for no reason.  Trouble breathing.  Waking from sleep because of the need to sit up and get more air.  Rapid heartbeat.  Being very tired.  Feeling dizzy, or feeling like you may pass out (faint).  Having no desire to eat.  Feeling like you may vomit (nauseous).  Peeing (urinating) more at night.  Feeling confused.  How is this treated?         This condition may be treated with:  Medicines. These can be given to treat blood pressure and to make the heart muscles stronger.  Changes in your daily life. These may include eating a healthy diet, staying at a healthy body weight, quitting tobacco and illegal drug use, or doing exercises.  Surgery. Surgery can be done to open blocked valves, or to put devices in the heart, such as pacemakers.  A donor heart (heart  transplant). You will receive a healthy heart from a donor.  Follow these instructions at home:  Treat other conditions as told by your doctor. These may include high blood pressure, diabetes, thyroid disease, or abnormal heart rhythms.  Learn as much as you can about heart failure.  Get support as you need it.  Keep all follow-up visits as told by your doctor. This is important.  Summary  Heart failure means that your heart is not able to pump blood in the right way.  This condition is caused by high blood pressure, heart attack, or damage of the heart muscle.  Symptoms of this condition include shortness of breath and swelling of the feet, ankles, legs, or belly. You may also feel very tired or feel like you may vomit.  You may be treated with medicines, surgery, or changes in your daily life.  Treat other health conditions as told by your doctor.  This information is not intended to replace advice given to you by your health care provider. Make sure you discuss any questions you have with your health care provider.  Document Revised: 03/06/2020 Document Reviewed: 03/06/2020  [employer] Patient Education © 2021 [employer]  Low-Sodium Eating Plan  Sodium, which is an element that makes up salt, helps you maintain a healthy balance of fluids in your body. Too much sodium can increase your blood pressure and cause fluid and waste to be held in your body.  Your health care provider or dietitian may recommend following this plan if you have high blood pressure (hypertension), kidney disease, liver disease, or heart failure. Eating less sodium can help lower your blood pressure, reduce swelling, and protect your heart, liver, and kidneys.  What are tips for following this plan?  Reading food labels  The Nutrition Facts label lists the amount of sodium in one serving of the food. If you eat more than one serving, you must multiply the listed amount of sodium by the number of servings.  Choose foods with less than 140 mg  "of sodium per serving.  Avoid foods with 300 mg of sodium or more per serving.  Shopping    Look for lower-sodium products, often labeled as \"low-sodium\" or \"no salt added.\"  Always check the sodium content, even if foods are labeled as \"unsalted\" or \"no salt added.\"  Buy fresh foods.  Avoid canned foods and pre-made or frozen meals.  Avoid canned, cured, or processed meats.  Buy breads that have less than 80 mg of sodium per slice.    Cooking    Eat more home-cooked food and less restaurant, buffet, and fast food.  Avoid adding salt when cooking. Use salt-free seasonings or herbs instead of table salt or sea salt. Check with your health care provider or pharmacist before using salt substitutes.  Cook with plant-based oils, such as canola, sunflower, or olive oil.    Meal planning  When eating at a restaurant, ask that your food be prepared with less salt or no salt, if possible. Avoid dishes labeled as brined, pickled, cured, smoked, or made with soy sauce, miso, or teriyaki sauce.  Avoid foods that contain MSG (monosodium glutamate). MSG is sometimes added to Chinese food, bouillon, and some canned foods.  Make meals that can be grilled, baked, poached, roasted, or steamed. These are generally made with less sodium.  General information  Most people on this plan should limit their sodium intake to 1,500-2,000 mg (milligrams) of sodium each day.  What foods should I eat?  Fruits  Fresh, frozen, or canned fruit. Fruit juice.  Vegetables  Fresh or frozen vegetables. \"No salt added\" canned vegetables. \"No salt added\" tomato sauce and paste. Low-sodium or reduced-sodium tomato and vegetable juice.  Grains  Low-sodium cereals, including oats, puffed wheat and rice, and shredded wheat. Low-sodium crackers. Unsalted rice. Unsalted pasta. Low-sodium bread. Whole-grain breads and whole-grain pasta.  Meats and other proteins  Fresh or frozen (no salt added) meat, poultry, seafood, and fish. Low-sodium canned tuna and salmon. " Unsalted nuts. Dried peas, beans, and lentils without added salt. Unsalted canned beans. Eggs. Unsalted nut butters.  Dairy  Milk. Soy milk. Cheese that is naturally low in sodium, such as ricotta cheese, fresh mozzarella, or Swiss cheese. Low-sodium or reduced-sodium cheese. Cream cheese. Yogurt.  Seasonings and condiments  Fresh and dried herbs and spices. Salt-free seasonings. Low-sodium mustard and ketchup. Sodium-free salad dressing. Sodium-free light mayonnaise. Fresh or refrigerated horseradish. Lemon juice. Vinegar.  Other foods  Homemade, reduced-sodium, or low-sodium soups. Unsalted popcorn and pretzels. Low-salt or salt-free chips.  The items listed above may not be a complete list of foods and beverages you can eat. Contact a dietitian for more information.  What foods should I avoid?  Vegetables  Sauerkraut, pickled vegetables, and relishes. Olives. French fries. Onion rings. Regular canned vegetables (not low-sodium or reduced-sodium). Regular canned tomato sauce and paste (not low-sodium or reduced-sodium). Regular tomato and vegetable juice (not low-sodium or reduced-sodium). Frozen vegetables in sauces.  Grains  Instant hot cereals. Bread stuffing, pancake, and biscuit mixes. Croutons. Seasoned rice or pasta mixes. Noodle soup cups. Boxed or frozen macaroni and cheese. Regular salted crackers. Self-rising flour.  Meats and other proteins  Meat or fish that is salted, canned, smoked, spiced, or pickled. Precooked or cured meat, such as sausages or meat loaves. Gould. Ham. Pepperoni. Hot dogs. Corned beef. Chipped beef. Salt pork. Jerky. Pickled herring. Anchovies and sardines. Regular canned tuna. Salted nuts.  Dairy  Processed cheese and cheese spreads. Hard cheeses. Cheese curds. Blue cheese. Feta cheese. String cheese. Regular cottage cheese. Buttermilk. Canned milk.  Fats and oils  Salted butter. Regular margarine. Ghee. Gould fat.  Seasonings and condiments  Onion salt, garlic salt, seasoned  salt, table salt, and sea salt. Canned and packaged gravies. Worcestershire sauce. Tartar sauce. Barbecue sauce. Teriyaki sauce. Soy sauce, including reduced-sodium. Steak sauce. Fish sauce. Oyster sauce. Cocktail sauce. Horseradish that you find on the shelf. Regular ketchup and mustard. Meat flavorings and tenderizers. Bouillon cubes. Hot sauce. Pre-made or packaged marinades. Pre-made or packaged taco seasonings. Relishes. Regular salad dressings. Salsa.  Other foods  Salted popcorn and pretzels. Corn chips and puffs. Potato and tortilla chips. Canned or dried soups. Pizza. Frozen entrees and pot pies.  The items listed above may not be a complete list of foods and beverages you should avoid. Contact a dietitian for more information.  Summary  Eating less sodium can help lower your blood pressure, reduce swelling, and protect your heart, liver, and kidneys.  Most people on this plan should limit their sodium intake to 1,500-2,000 mg (milligrams) of sodium each day.  Canned, boxed, and frozen foods are high in sodium. Restaurant foods, fast foods, and pizza are also very high in sodium. You also get sodium by adding salt to food.  Try to cook at home, eat more fresh fruits and vegetables, and eat less fast food and canned, processed, or prepared foods.  This information is not intended to replace advice given to you by your health care provider. Make sure you discuss any questions you have with your health care provider.  Document Revised: 01/22/2021 Document Reviewed: 11/18/2020  [employer] Patient Education © 2021 [employer]

## 2022-03-28 ENCOUNTER — LAB (OUTPATIENT)
Dept: LAB | Facility: HOSPITAL | Age: 67
End: 2022-03-28

## 2022-03-28 ENCOUNTER — CLINICAL SUPPORT (OUTPATIENT)
Dept: ORTHOPEDIC SURGERY | Facility: CLINIC | Age: 67
End: 2022-03-28

## 2022-03-28 DIAGNOSIS — R06.02 SHORTNESS OF BREATH: ICD-10-CM

## 2022-03-28 DIAGNOSIS — M17.31 POST-TRAUMATIC OSTEOARTHRITIS OF RIGHT KNEE: ICD-10-CM

## 2022-03-28 DIAGNOSIS — M70.61 GREATER TROCHANTERIC BURSITIS OF RIGHT HIP: Primary | ICD-10-CM

## 2022-03-28 DIAGNOSIS — I10 HYPERTENSION, ESSENTIAL: ICD-10-CM

## 2022-03-28 PROCEDURE — 80048 BASIC METABOLIC PNL TOTAL CA: CPT

## 2022-03-28 PROCEDURE — 83880 ASSAY OF NATRIURETIC PEPTIDE: CPT

## 2022-03-28 PROCEDURE — 20610 DRAIN/INJ JOINT/BURSA W/O US: CPT | Performed by: PHYSICIAN ASSISTANT

## 2022-03-28 PROCEDURE — 36415 COLL VENOUS BLD VENIPUNCTURE: CPT

## 2022-03-28 RX ORDER — TRIAMCINOLONE ACETONIDE 40 MG/ML
80 INJECTION, SUSPENSION INTRA-ARTICULAR; INTRAMUSCULAR
Status: COMPLETED | OUTPATIENT
Start: 2022-03-28 | End: 2022-03-28

## 2022-03-28 RX ORDER — METHYLPREDNISOLONE ACETATE 80 MG/ML
160 INJECTION, SUSPENSION INTRA-ARTICULAR; INTRALESIONAL; INTRAMUSCULAR; SOFT TISSUE
Status: COMPLETED | OUTPATIENT
Start: 2022-03-28 | End: 2022-03-28

## 2022-03-28 RX ORDER — LIDOCAINE HYDROCHLORIDE 20 MG/ML
2 INJECTION, SOLUTION EPIDURAL; INFILTRATION; INTRACAUDAL; PERINEURAL
Status: COMPLETED | OUTPATIENT
Start: 2022-03-28 | End: 2022-03-28

## 2022-03-28 RX ADMIN — LIDOCAINE HYDROCHLORIDE 2 ML: 20 INJECTION, SOLUTION EPIDURAL; INFILTRATION; INTRACAUDAL; PERINEURAL at 15:20

## 2022-03-28 RX ADMIN — METHYLPREDNISOLONE ACETATE 160 MG: 80 INJECTION, SUSPENSION INTRA-ARTICULAR; INTRALESIONAL; INTRAMUSCULAR; SOFT TISSUE at 15:20

## 2022-03-28 RX ADMIN — TRIAMCINOLONE ACETONIDE 80 MG: 40 INJECTION, SUSPENSION INTRA-ARTICULAR; INTRAMUSCULAR at 15:20

## 2022-03-28 NOTE — PROGRESS NOTES
Chief Complaint  Follow-up of the Right Hip and Follow-up of the Right Knee    Subjective    History of Present Illness      Dennis Trinh Jr. is a 67 y.o. male who presents to [facility] for is here today for injection therapy. He receives  RIGHT knee and RIGHT hip injections of steroid. Since last injection, patient notes substantial relief of symptoms. He reports significant discomfort in both the right knee and the right hip.  Treatment options have been discussed and he understands and consents.       Objective   Vital Signs:   There were no vitals taken for this visit.    Physical Exam  67 y.o. male is awake, alert, oriented, in no acute distress and well developed, well nourished.  Ortho Exam   RIGHT knee  There is Mild joint line tenderness at the medial aspect of the knee.   Positive for varus orientation of the knee.   Positive for crepitus throughout range of motion.   Negative for effusion.  Positive patellar grind test.   Negative Lachman test.    Negative anterior and posterior drawer.  Range of motion in extension and flexion is: 0-115 degrees.  Neurovascular status is intact.    Dorsalis pedis and posterior tibial artery pulses are palpable.    Common peroneal nerve function is well preserved.  Gait is cautious and antalgic.     RIGHT hip  There is tenderness with palpation over the greater trochanteric bursa.   There is tenderness with palpation of the IT band.  Cross body adduction is positive.   Positive for pain over the greater trochanter with internal and external rotation.   There is no clinical deformity and no shortening of extremity.   He does have a limp upon walking.   There is piriformis tightness.   Dorsalis pedis and posterior tibial artery pulses are palpable.   Neurovascular status is intact and capillary refill is less than 2 seconds.   Common peroneal nerve function is well preserved.           PROCEDURE  Large Joint Arthrocentesis: R knee  Date/Time:  3/28/2022 3:20 PM  Consent given by: patient  Site marked: site marked  Timeout: Immediately prior to procedure a time out was called to verify the correct patient, procedure, equipment, support staff and site/side marked as required   Supporting Documentation  Indications: pain   Procedure Details  Location: knee - R knee  Preparation: Patient was prepped and draped in the usual sterile fashion  Needle size: 25 G  Approach: anteromedial  Medications administered: 160 mg methylPREDNISolone acetate 80 MG/ML; 2 mL lidocaine PF 2% 2 %  Patient tolerance: patient tolerated the procedure well with no immediate complications    Large Joint Arthrocentesis: R greater trochanteric bursa  Date/Time: 3/28/2022 3:20 PM  Consent given by: patient  Site marked: site marked  Timeout: Immediately prior to procedure a time out was called to verify the correct patient, procedure, equipment, support staff and site/side marked as required   Supporting Documentation  Indications: pain   Procedure Details  Location: hip - R greater trochanteric bursa  Preparation: Patient was prepped and draped in the usual sterile fashion  Needle size: 22 G  Approach: lateral  Medications administered: 2 mL lidocaine PF 2% 2 %; 80 mg triamcinolone acetonide 40 MG/ML  Patient tolerance: patient tolerated the procedure well with no immediate complications        Injection site was identified by physical examination and cleaned with Betadine and alcohol swabs. Prior to needle insertion, ethyl chloride spray was used for surface anesthesia. Sterile technique was used.       Assessment   Assessment and Plan    Problem List Items Addressed This Visit        Musculoskeletal and Injuries    Post-traumatic osteoarthritis of right knee    Relevant Orders    Large Joint Arthrocentesis: R knee    Greater trochanteric bursitis of right hip - Primary    Relevant Orders    Large Joint Arthrocentesis: R greater trochanteric bursa          Follow Up   • Right knee depo  medrol injection and right greater trochanteric bursa Kenalog injection was discussed with the patient. Discussed indication, risks, benefits, and alternatives. Verbal consent was given to proceed with the procedure. Patient tolerated the procedure well and no complications were encountered.  • Discussion of orthopedic goals and activities and patient/guardian expressed understanding.  • Ice, heat, rest, compression and elevation of extremity as beneficial  • nsaids and/or tylenol as beneficial  • Instructed to refrain from heavy activity/rest the extremity for the next 24-48 hours  • Discussion regarding possibility of cortisol flare and what to expect if this occurs  • Watch for signs and symptoms of infection  • Call if adverse effect from injection therapy  • Follow up in 3 months.  He will call if he needs injections in the shoulder sooner.    • Patient was given instructions and counseling regarding his condition or for health maintenance advice. Please see specific information pulled into the AVS if appropriate.     Kirt Guthrie PA-C   Date of Encounter: 3/28/2022   Electronically signed by Kirt Guthrie PA-C, 03/28/22, 3:41 PM EDT.       EMR Dragon/Transcription disclaimer:  Much of this encounter note is an electronic transcription/translation of spoken language to printed text. The electronic translation of spoken language may permit erroneous, or at times, nonsensical words or phrases to be inadvertently transcribed; Although I have reviewed the note for such errors, some may still exist.

## 2022-03-29 ENCOUNTER — TELEPHONE (OUTPATIENT)
Dept: CARDIOLOGY | Facility: CLINIC | Age: 67
End: 2022-03-29

## 2022-03-29 DIAGNOSIS — I05.0 MITRAL VALVE STENOSIS, UNSPECIFIED ETIOLOGY: Primary | ICD-10-CM

## 2022-03-29 LAB
ANION GAP SERPL CALCULATED.3IONS-SCNC: 8 MMOL/L (ref 5–15)
BUN SERPL-MCNC: 21 MG/DL (ref 8–23)
BUN/CREAT SERPL: 19.4 (ref 7–25)
CALCIUM SPEC-SCNC: 9.3 MG/DL (ref 8.6–10.5)
CHLORIDE SERPL-SCNC: 99 MMOL/L (ref 98–107)
CO2 SERPL-SCNC: 33 MMOL/L (ref 22–29)
CREAT SERPL-MCNC: 1.08 MG/DL (ref 0.76–1.27)
EGFRCR SERPLBLD CKD-EPI 2021: 74.3 ML/MIN/1.73
GLUCOSE SERPL-MCNC: 102 MG/DL (ref 65–99)
NT-PROBNP SERPL-MCNC: 3270 PG/ML (ref 0–900)
POTASSIUM SERPL-SCNC: 3.8 MMOL/L (ref 3.5–5.2)
SODIUM SERPL-SCNC: 140 MMOL/L (ref 136–145)

## 2022-03-29 NOTE — TELEPHONE ENCOUNTER
----- Message from ALEC Pacheco sent at 3/29/2022  9:08 AM EDT -----  Notify pt renal function is stable. BNP is elevated. He can take extra dose of lasix for fluid retention. Advise to weigh daily and take extra dose of lasix for weight gain of 5 lb or greater in 3 day period. Limit fluid intake to no more than 2L/day. Low sodium diet.

## 2022-04-19 ENCOUNTER — TELEPHONE (OUTPATIENT)
Dept: CARDIOLOGY | Facility: CLINIC | Age: 67
End: 2022-04-19

## 2022-04-19 NOTE — TELEPHONE ENCOUNTER
Received VM from patient wanting to know if blood work is needed and having some low BP around 2.     SW patient. Advised no blood work needed. Patient also states around 2 his BP will drop to 88/50s. Patient has been taking his metoprolol 50 mg twice a day instead of once. Advised per our records he should only be taking once daily. Patient to take once daily and monitor.

## 2022-06-06 ENCOUNTER — TELEPHONE (OUTPATIENT)
Dept: CARDIOLOGY | Facility: CLINIC | Age: 67
End: 2022-06-06

## 2022-06-06 DIAGNOSIS — R06.02 SHORTNESS OF BREATH: Primary | ICD-10-CM

## 2022-06-06 NOTE — TELEPHONE ENCOUNTER
"Received Vm from patient     HILLARY patient. Patient c/o SOA with exertion. Patient also c/o BLE swelling and some \"facial puffiness\". Patient states he has not noticed any weight gain.     Confirmed patient taking\"  Lasix 40 mg BID      Advised I would return call with recommendations.   "

## 2022-06-06 NOTE — TELEPHONE ENCOUNTER
Increase lasix to 80 mg BID for 3 days  Cut down on fluid intake  No more than 140 mg sodium per serving size on nutrition label  Monitor weight daily  Compression socks or ace wrap for swelling  Check BMP and BNP in 1 week

## 2022-06-13 ENCOUNTER — LAB (OUTPATIENT)
Dept: LAB | Facility: HOSPITAL | Age: 67
End: 2022-06-13

## 2022-06-13 DIAGNOSIS — R06.02 SHORTNESS OF BREATH: ICD-10-CM

## 2022-06-13 LAB
ANION GAP SERPL CALCULATED.3IONS-SCNC: 12.8 MMOL/L (ref 5–15)
BUN SERPL-MCNC: 17 MG/DL (ref 8–23)
BUN/CREAT SERPL: 13.3 (ref 7–25)
CALCIUM SPEC-SCNC: 8.9 MG/DL (ref 8.6–10.5)
CHLORIDE SERPL-SCNC: 102 MMOL/L (ref 98–107)
CO2 SERPL-SCNC: 25.2 MMOL/L (ref 22–29)
CREAT SERPL-MCNC: 1.28 MG/DL (ref 0.76–1.27)
EGFRCR SERPLBLD CKD-EPI 2021: 60.6 ML/MIN/1.73
GLUCOSE SERPL-MCNC: 96 MG/DL (ref 65–99)
NT-PROBNP SERPL-MCNC: 3543 PG/ML (ref 0–900)
POTASSIUM SERPL-SCNC: 3.3 MMOL/L (ref 3.5–5.2)
SODIUM SERPL-SCNC: 140 MMOL/L (ref 136–145)

## 2022-06-13 PROCEDURE — 83880 ASSAY OF NATRIURETIC PEPTIDE: CPT

## 2022-06-13 PROCEDURE — 36415 COLL VENOUS BLD VENIPUNCTURE: CPT

## 2022-06-13 PROCEDURE — 80048 BASIC METABOLIC PNL TOTAL CA: CPT

## 2022-06-15 ENCOUNTER — TELEPHONE (OUTPATIENT)
Dept: CARDIOLOGY | Facility: CLINIC | Age: 67
End: 2022-06-15

## 2022-06-15 DIAGNOSIS — R06.02 SHORTNESS OF BREATH: Primary | ICD-10-CM

## 2022-06-15 DIAGNOSIS — E87.6 HYPOKALEMIA: ICD-10-CM

## 2022-06-15 NOTE — TELEPHONE ENCOUNTER
Pt called and asked if it would be ok to start taking Monteluklast 10 mg.  He states that it really helps with his breathing.    Please advise.

## 2022-06-15 NOTE — TELEPHONE ENCOUNTER
----- Message from ALEC Pacheco sent at 6/14/2022  9:12 AM EDT -----  BNP is still elevated  Potassium is low  Increase lasix from 40 mg daily to 80 mg daily.  Increase potassium from 10 meq daily to 20 meq daily.  Recheck BMP and BNP in 2 weeks

## 2022-06-23 NOTE — PROGRESS NOTES
[facility]  Cardiology progress Note    Patient Name: Dennis Trinh Jr.  : 1955    CHIEF COMPLAINT  Atrial fibrillation      Subjective   Subjective     HISTORY OF PRESENT ILLNESS    Dennis Trinh Jr. is a 67 y.o. male with history of atrial fibrillation.  No chest pain or shortness of breath.    Review of Systems:   Constitutional no fever,  no weight loss   Skin no rash   Otolaryngeal no difficulty swallowing   Cardiovascular See HPI   Pulmonary no cough, no sputum production   Gastrointestinal no constipation, no diarrhea   Genitourinary no dysuria, no hematuria   Hematologic no easy bruisability, no abnormal bleeding   Musculoskeletal no muscle pain   Neurologic no dizziness, no falls         Personal History     Social History:  reports that he has never smoked. He has never used smokeless tobacco. Drug use questions deferred to the physician. He reports that he does not drink alcohol.    Home Medications:  Current Outpatient Medications on File Prior to Visit   Medication Sig   • apixaban (ELIQUIS) 5 MG tablet tablet Take 1 tablet by mouth 2 (Two) Times a Day.   • fexofenadine (ALLEGRA) 180 MG tablet    • furosemide (LASIX) 40 MG tablet Take 1 tablet by mouth 2 (Two) Times a Day. (Patient taking differently: Take 80 mg by mouth 2 (Two) Times a Day.)   • hydrochlorothiazide (HYDRODIURIL) 25 MG tablet    • losartan (COZAAR) 100 MG tablet Take 1 tablet by mouth Daily. 1 1/2 TAB DAILY (Patient taking differently: Take 50 mg by mouth 2 (Two) Times a Day.)   • metoprolol succinate XL (TOPROL-XL) 50 MG 24 hr tablet Take 1 tablet by mouth Daily.   • potassium chloride 10 MEQ CR tablet Take 1 tablet by mouth Daily. (Patient taking differently: Take 20 mEq by mouth Daily.)   • sildenafil (VIAGRA) 50 MG tablet TAKE ONE TABLET BY MOUTH TWO hours prior TO sexual activity     No current facility-administered medications on file prior to visit.     Allergies:  No Known Allergies    Objective    Objective        Vitals:   Heart Rate:  [94] 94  BP: (120)/(74) 120/74  Body mass index is 28.55 kg/m².     Physical Exam:   Constitutional: Awake, alert, No acute distress    Eyes: PERRLA, sclerae anicteric, no conjunctival injection   HENT: NCAT, mucous membranes moist   Neck: Supple, no thyromegaly, no lymphadenopathy, trachea midline   Respiratory: Clear to auscultation bilaterally, nonlabored respirations    Cardiovascular: RRR, no murmurs or rubs. Palpable pedal pulses bilaterally   Musculoskeletal: No bilateral ankle edema, no cyanosis to extremities   Psychiatric: Appropriate affect, cooperative   Neurologic: Oriented x 3, strength symmetric in all extremities, Cranial Nerves grossly intact to confrontation, speech clear   Skin: No rashes.    Result Review    Result Review:  I have personally reviewed the available results from  [x]  Laboratory  [x]  EKG  [x]  Cardiology  [x]  Medications  [x]  Old records  []  Other:   Procedures  Results for orders placed in visit on 03/22/22    Adult Transthoracic Echo Complete W/ Cont if Necessary Per Protocol    Interpretation Summary  Fibrocalcific mitral valve with mitral valve prolapse.  Moderate mitral regurgitation which is an eccentric jet.  Mild to moderate mitral stenosis.  Normal left ventricular systolic function.  Mild tricuspid regurgitation.     Impression/Plan:  1. Persistent atrial fibrillation controlled heart rate: Continue Eliquis 5 mg twice daily for stroke prevention. Continue Toprol-XL 50 mg a day for rate control.  2.  Chronic diastolic heart failure stable: Continue Lasix  80 mg twice daily.    Recently increased in view of high BNP.  Repeat BMP and BNP pending.  Adjust dose depending upon renal function.  Echocardiogram shows normal left ventricular systolic function with moderate valvular heart disease.  Low-salt diet and fluid restriction advised.  3.  Moderate mitral regurgitation/mitral stenosis:  Stable.  4.  Essential hypertension controlled: Continue  losartan 50 mg twice a day.      Chase Julio MD   06/27/22   11:36 EDT

## 2022-06-27 ENCOUNTER — CLINICAL SUPPORT (OUTPATIENT)
Dept: ORTHOPEDIC SURGERY | Facility: CLINIC | Age: 67
End: 2022-06-27

## 2022-06-27 ENCOUNTER — OFFICE VISIT (OUTPATIENT)
Dept: CARDIOLOGY | Facility: CLINIC | Age: 67
End: 2022-06-27

## 2022-06-27 VITALS
DIASTOLIC BLOOD PRESSURE: 74 MMHG | BODY MASS INDEX: 28.49 KG/M2 | HEART RATE: 94 BPM | SYSTOLIC BLOOD PRESSURE: 120 MMHG | HEIGHT: 70 IN | WEIGHT: 199 LBS

## 2022-06-27 VITALS — HEIGHT: 69 IN | BODY MASS INDEX: 29.18 KG/M2 | WEIGHT: 197 LBS

## 2022-06-27 DIAGNOSIS — I10 HYPERTENSION, ESSENTIAL: ICD-10-CM

## 2022-06-27 DIAGNOSIS — M70.61 GREATER TROCHANTERIC BURSITIS OF RIGHT HIP: Primary | ICD-10-CM

## 2022-06-27 DIAGNOSIS — I48.21 PERMANENT ATRIAL FIBRILLATION: Primary | ICD-10-CM

## 2022-06-27 DIAGNOSIS — I50.32 DIASTOLIC CHF, CHRONIC: ICD-10-CM

## 2022-06-27 DIAGNOSIS — M17.31 POST-TRAUMATIC OSTEOARTHRITIS OF RIGHT KNEE: ICD-10-CM

## 2022-06-27 PROCEDURE — 99214 OFFICE O/P EST MOD 30 MIN: CPT | Performed by: SPECIALIST

## 2022-06-27 PROCEDURE — 20610 DRAIN/INJ JOINT/BURSA W/O US: CPT | Performed by: PHYSICIAN ASSISTANT

## 2022-06-27 RX ORDER — METHYLPREDNISOLONE ACETATE 80 MG/ML
160 INJECTION, SUSPENSION INTRA-ARTICULAR; INTRALESIONAL; INTRAMUSCULAR; SOFT TISSUE
Status: COMPLETED | OUTPATIENT
Start: 2022-06-27 | End: 2022-06-27

## 2022-06-27 RX ADMIN — METHYLPREDNISOLONE ACETATE 160 MG: 80 INJECTION, SUSPENSION INTRA-ARTICULAR; INTRALESIONAL; INTRAMUSCULAR; SOFT TISSUE at 15:21

## 2022-06-27 RX ADMIN — METHYLPREDNISOLONE ACETATE 160 MG: 80 INJECTION, SUSPENSION INTRA-ARTICULAR; INTRALESIONAL; INTRAMUSCULAR; SOFT TISSUE at 15:26

## 2022-06-27 NOTE — PATIENT INSTRUCTIONS
"Low-Sodium Eating Plan  Sodium, which is an element that makes up salt, helps you maintain a healthy balance of fluids in your body. Too much sodium can increase your blood pressure and cause fluid and waste to be held in your body.  Your health care provider or dietitian may recommend following this plan if you have high blood pressure (hypertension), kidney disease, liver disease, or heart failure. Eating less sodium can help lower your blood pressure, reduce swelling, and protect your heart, liver, and kidneys.  What are tips for following this plan?  Reading food labels  The Nutrition Facts label lists the amount of sodium in one serving of the food. If you eat more than one serving, you must multiply the listed amount of sodium by the number of servings.  Choose foods with less than 140 mg of sodium per serving.  Avoid foods with 300 mg of sodium or more per serving.  Shopping    Look for lower-sodium products, often labeled as \"low-sodium\" or \"no salt added.\"  Always check the sodium content, even if foods are labeled as \"unsalted\" or \"no salt added.\"  Buy fresh foods.  Avoid canned foods and pre-made or frozen meals.  Avoid canned, cured, or processed meats.  Buy breads that have less than 80 mg of sodium per slice.    Cooking    Eat more home-cooked food and less restaurant, buffet, and fast food.  Avoid adding salt when cooking. Use salt-free seasonings or herbs instead of table salt or sea salt. Check with your health care provider or pharmacist before using salt substitutes.  Cook with plant-based oils, such as canola, sunflower, or olive oil.    Meal planning  When eating at a restaurant, ask that your food be prepared with less salt or no salt, if possible. Avoid dishes labeled as brined, pickled, cured, smoked, or made with soy sauce, miso, or teriyaki sauce.  Avoid foods that contain MSG (monosodium glutamate). MSG is sometimes added to Chinese food, bouillon, and some canned foods.  Make meals that " "can be grilled, baked, poached, roasted, or steamed. These are generally made with less sodium.  General information  Most people on this plan should limit their sodium intake to 1,500-2,000 mg (milligrams) of sodium each day.  What foods should I eat?  Fruits  Fresh, frozen, or canned fruit. Fruit juice.  Vegetables  Fresh or frozen vegetables. \"No salt added\" canned vegetables. \"No salt added\" tomato sauce and paste. Low-sodium or reduced-sodium tomato and vegetable juice.  Grains  Low-sodium cereals, including oats, puffed wheat and rice, and shredded wheat. Low-sodium crackers. Unsalted rice. Unsalted pasta. Low-sodium bread. Whole-grain breads and whole-grain pasta.  Meats and other proteins  Fresh or frozen (no salt added) meat, poultry, seafood, and fish. Low-sodium canned tuna and salmon. Unsalted nuts. Dried peas, beans, and lentils without added salt. Unsalted canned beans. Eggs. Unsalted nut butters.  Dairy  Milk. Soy milk. Cheese that is naturally low in sodium, such as ricotta cheese, fresh mozzarella, or Swiss cheese. Low-sodium or reduced-sodium cheese. Cream cheese. Yogurt.  Seasonings and condiments  Fresh and dried herbs and spices. Salt-free seasonings. Low-sodium mustard and ketchup. Sodium-free salad dressing. Sodium-free light mayonnaise. Fresh or refrigerated horseradish. Lemon juice. Vinegar.  Other foods  Homemade, reduced-sodium, or low-sodium soups. Unsalted popcorn and pretzels. Low-salt or salt-free chips.  The items listed above may not be a complete list of foods and beverages you can eat. Contact a dietitian for more information.  What foods should I avoid?  Vegetables  Sauerkraut, pickled vegetables, and relishes. Olives. French fries. Onion rings. Regular canned vegetables (not low-sodium or reduced-sodium). Regular canned tomato sauce and paste (not low-sodium or reduced-sodium). Regular tomato and vegetable juice (not low-sodium or reduced-sodium). Frozen vegetables in " sauces.  Grains  Instant hot cereals. Bread stuffing, pancake, and biscuit mixes. Croutons. Seasoned rice or pasta mixes. Noodle soup cups. Boxed or frozen macaroni and cheese. Regular salted crackers. Self-rising flour.  Meats and other proteins  Meat or fish that is salted, canned, smoked, spiced, or pickled. Precooked or cured meat, such as sausages or meat loaves. Gould. Ham. Pepperoni. Hot dogs. Corned beef. Chipped beef. Salt pork. Jerky. Pickled herring. Anchovies and sardines. Regular canned tuna. Salted nuts.  Dairy  Processed cheese and cheese spreads. Hard cheeses. Cheese curds. Blue cheese. Feta cheese. String cheese. Regular cottage cheese. Buttermilk. Canned milk.  Fats and oils  Salted butter. Regular margarine. Ghee. Gould fat.  Seasonings and condiments  Onion salt, garlic salt, seasoned salt, table salt, and sea salt. Canned and packaged gravies. Worcestershire sauce. Tartar sauce. Barbecue sauce. Teriyaki sauce. Soy sauce, including reduced-sodium. Steak sauce. Fish sauce. Oyster sauce. Cocktail sauce. Horseradish that you find on the shelf. Regular ketchup and mustard. Meat flavorings and tenderizers. Bouillon cubes. Hot sauce. Pre-made or packaged marinades. Pre-made or packaged taco seasonings. Relishes. Regular salad dressings. Salsa.  Other foods  Salted popcorn and pretzels. Corn chips and puffs. Potato and tortilla chips. Canned or dried soups. Pizza. Frozen entrees and pot pies.  The items listed above may not be a complete list of foods and beverages you should avoid. Contact a dietitian for more information.  Summary  Eating less sodium can help lower your blood pressure, reduce swelling, and protect your heart, liver, and kidneys.  Most people on this plan should limit their sodium intake to 1,500-2,000 mg (milligrams) of sodium each day.  Canned, boxed, and frozen foods are high in sodium. Restaurant foods, fast foods, and pizza are also very high in sodium. You also get sodium by  adding salt to food.  Try to cook at home, eat more fresh fruits and vegetables, and eat less fast food and canned, processed, or prepared foods.  This information is not intended to replace advice given to you by your health care provider. Make sure you discuss any questions you have with your health care provider.  Document Revised: 01/22/2021 Document Reviewed: 11/18/2020  [employer] Patient Education © 2021 [employer]

## 2022-06-27 NOTE — PROGRESS NOTES
"Chief Complaint  Follow-up of the Right Hip and Follow-up of the Right Knee    Subjective    History of Present Illness      Dennis Trinh Jr. is a 67 y.o. male who presents to [facility] for is here today for injection therapy. He receives  RIGHT knee and RIGHT hip injections of steroid. Since last injection, patient notes substantial relief of symptoms. He reports significant discomfort in both the right knee and the right hip over the last few weeks.  Treatment options have been discussed and he understands and consents.       Objective   Vital Signs:   Ht 175.3 cm (69\")   Wt 89.4 kg (197 lb)   BMI 29.09 kg/m²     Physical Exam  67 y.o. male is awake, alert, oriented, in no acute distress and well developed, well nourished.  Ortho Exam   RIGHT knee  There is Mild joint line tenderness at the medial aspect of the knee.   Positive for varus orientation of the knee.   Positive for crepitus throughout range of motion.   Negative for effusion.  Positive patellar grind test.   Negative Lachman test.    Negative anterior and posterior drawer.  Range of motion in extension and flexion is: 0-115 degrees.  Neurovascular status is intact.    Dorsalis pedis and posterior tibial artery pulses are palpable.    Common peroneal nerve function is well preserved.  Gait is cautious and antalgic.     RIGHT hip  There is tenderness with palpation over the greater trochanteric bursa.   There is tenderness with palpation of the IT band.  Cross body adduction is positive.   Positive for pain over the greater trochanter with internal and external rotation.   There is no clinical deformity and no shortening of extremity.   He does have a limp upon walking.   There is piriformis tightness.   Dorsalis pedis and posterior tibial artery pulses are palpable.   Neurovascular status is intact and capillary refill is less than 2 seconds.   Common peroneal nerve function is well preserved.           PROCEDURE  Large " Joint Arthrocentesis: R greater trochanteric bursa  Date/Time: 6/27/2022 3:21 PM  Consent given by: patient  Site marked: site marked  Timeout: Immediately prior to procedure a time out was called to verify the correct patient, procedure, equipment, support staff and site/side marked as required   Supporting Documentation  Indications: pain   Procedure Details  Location: hip - R greater trochanteric bursa  Preparation: Patient was prepped and draped in the usual sterile fashion  Needle size: 22 G  Approach: lateral  Medications administered: 160 mg methylPREDNISolone acetate 80 MG/ML  Patient tolerance: patient tolerated the procedure well with no immediate complications    Large Joint Arthrocentesis: R knee  Date/Time: 6/27/2022 3:26 PM  Consent given by: patient  Site marked: site marked  Timeout: Immediately prior to procedure a time out was called to verify the correct patient, procedure, equipment, support staff and site/side marked as required   Supporting Documentation  Indications: pain   Procedure Details  Location: knee - R knee  Preparation: Patient was prepped and draped in the usual sterile fashion  Needle size: 25 G  Approach: anteromedial  Medications administered: 160 mg methylPREDNISolone acetate 80 MG/ML  Patient tolerance: patient tolerated the procedure well with no immediate complications        Injection site was identified by physical examination and cleaned with Betadine and alcohol swabs. Prior to needle insertion, ethyl chloride spray was used for surface anesthesia. Sterile technique was used.       Assessment   Assessment and Plan    Problem List Items Addressed This Visit        Musculoskeletal and Injuries    Post-traumatic osteoarthritis of right knee    Relevant Orders    Large Joint Arthrocentesis: R knee    Greater trochanteric bursitis of right hip - Primary    Relevant Orders    Large Joint Arthrocentesis: R greater trochanteric bursa          Follow Up   • Right knee depo medrol  injection and right greater trochanteric bursa Kenalog injection was discussed with the patient. Discussed indication, risks, benefits, and alternatives. Verbal consent was given to proceed with the procedure. Patient tolerated the procedure well and no complications were encountered.  • Discussion of orthopedic goals and activities and patient/guardian expressed understanding.  • Ice, heat, rest, compression and elevation of extremity as beneficial  • nsaids and/or tylenol as beneficial  • Instructed to refrain from heavy activity/rest the extremity for the next 24-48 hours  • Discussion regarding possibility of cortisol flare and what to expect if this occurs  • Watch for signs and symptoms of infection  • Call if adverse effect from injection therapy  • Follow up in 3 months.  .    • Patient was given instructions and counseling regarding his condition or for health maintenance advice. Please see specific information pulled into the AVS if appropriate.     Kirt Guthrie PA-C   Date of Encounter: 6/27/2022   Electronically signed by Kirt Guthrie PA-C, 06/27/22, 4:37 PM EDT.      EMR Dragon/Transcription disclaimer:  Much of this encounter note is an electronic transcription/translation of spoken language to printed text. The electronic translation of spoken language may permit erroneous, or at times, nonsensical words or phrases to be inadvertently transcribed; Although I have reviewed the note for such errors, some may still exist.

## 2022-07-01 ENCOUNTER — LAB (OUTPATIENT)
Dept: LAB | Facility: HOSPITAL | Age: 67
End: 2022-07-01

## 2022-07-01 DIAGNOSIS — E87.6 HYPOKALEMIA: ICD-10-CM

## 2022-07-01 DIAGNOSIS — R06.02 SHORTNESS OF BREATH: ICD-10-CM

## 2022-07-01 PROCEDURE — 36415 COLL VENOUS BLD VENIPUNCTURE: CPT

## 2022-07-01 PROCEDURE — 83880 ASSAY OF NATRIURETIC PEPTIDE: CPT

## 2022-07-01 PROCEDURE — 80048 BASIC METABOLIC PNL TOTAL CA: CPT

## 2022-07-02 LAB
ANION GAP SERPL CALCULATED.3IONS-SCNC: 13.1 MMOL/L (ref 5–15)
BUN SERPL-MCNC: 33 MG/DL (ref 8–23)
BUN/CREAT SERPL: 25.4 (ref 7–25)
CALCIUM SPEC-SCNC: 9.6 MG/DL (ref 8.6–10.5)
CHLORIDE SERPL-SCNC: 98 MMOL/L (ref 98–107)
CO2 SERPL-SCNC: 27.9 MMOL/L (ref 22–29)
CREAT SERPL-MCNC: 1.3 MG/DL (ref 0.76–1.27)
EGFRCR SERPLBLD CKD-EPI 2021: 59.5 ML/MIN/1.73
GLUCOSE SERPL-MCNC: 101 MG/DL (ref 65–99)
NT-PROBNP SERPL-MCNC: 4600 PG/ML (ref 0–900)
POTASSIUM SERPL-SCNC: 3.9 MMOL/L (ref 3.5–5.2)
SODIUM SERPL-SCNC: 139 MMOL/L (ref 136–145)

## 2022-07-05 ENCOUNTER — TELEPHONE (OUTPATIENT)
Dept: CARDIOLOGY | Facility: CLINIC | Age: 67
End: 2022-07-05

## 2022-07-05 NOTE — TELEPHONE ENCOUNTER
Okay, advise that he continue his meds as prescribed. Limit fluid intake to no more than 2 liters/day. Limit sodium intake to no more than 2 grams/day. He can benefit from compression sock to the right leg

## 2022-07-05 NOTE — TELEPHONE ENCOUNTER
Informed pt of results.     He states he is having swelling of the rt lower extremity. Mostly around the ankle. He does have mild swelling of his right knee but claims that's normally because its his bad knee.

## 2022-07-05 NOTE — TELEPHONE ENCOUNTER
----- Message from ALEC Pacheco sent at 7/5/2022  8:46 AM EDT -----  Notify pt potassium is in normal range, renal function is stable, BNP is elevated--find out if he is having any shortness of breath and/or bilateral lower extremity edema

## 2022-07-06 ENCOUNTER — TELEPHONE (OUTPATIENT)
Dept: CARDIOLOGY | Facility: CLINIC | Age: 67
End: 2022-07-06

## 2022-07-06 NOTE — TELEPHONE ENCOUNTER
Received VM from patient wanting to know if he needed to repeat blood work    SW patient advised he did not need to repeat blood work. Voiced understanding.

## 2022-07-15 RX ORDER — POTASSIUM CHLORIDE 750 MG/1
20 TABLET, FILM COATED, EXTENDED RELEASE ORAL DAILY
Qty: 180 TABLET | Refills: 2 | Status: SHIPPED | OUTPATIENT
Start: 2022-07-15 | End: 2023-02-14

## 2022-08-04 RX ORDER — FUROSEMIDE 40 MG/1
80 TABLET ORAL 2 TIMES DAILY
Qty: 180 TABLET | Refills: 2 | Status: SHIPPED | OUTPATIENT
Start: 2022-08-04 | End: 2022-12-09

## 2022-10-10 RX ORDER — LOSARTAN POTASSIUM 100 MG/1
100 TABLET ORAL DAILY
Qty: 90 TABLET | Refills: 1 | Status: SHIPPED | OUTPATIENT
Start: 2022-10-10

## 2022-12-06 ENCOUNTER — TELEPHONE (OUTPATIENT)
Dept: ORTHOPEDIC SURGERY | Facility: CLINIC | Age: 67
End: 2022-12-06

## 2022-12-06 NOTE — TELEPHONE ENCOUNTER
Provider: QUINN JONES     Caller: KINGS AVILA      Relationship to Patient: SELF     Phone Number: [phone]    Reason for Call: PATIENT CALLED AND NEEDS TO BE SCHEDULED FOR AN INJECTION PLEASE ADVISE

## 2022-12-07 ENCOUNTER — TELEPHONE (OUTPATIENT)
Dept: ORTHOPEDIC SURGERY | Facility: CLINIC | Age: 67
End: 2022-12-07

## 2022-12-07 NOTE — TELEPHONE ENCOUNTER
SPOKE TO PATIENT AND TOLD TO PLEASE KEEP THE 12/16 APPOINTMENT AS WE DO NOT HAVE ANYTHING ANY SOONER

## 2022-12-07 NOTE — TELEPHONE ENCOUNTER
Caller: KINGS AVILA    Relationship to patient: SELF    Best call back number: [phone]    Chief complaint: LEFT SHOULDER AND RIGHT HIP    Type of visit: LEFT SHOULDER AND RIGHT HIP     Requested date: SOONER    If rescheduling, when is the original appointment: 12/16/2022     Additional notes: NA

## 2022-12-07 NOTE — TELEPHONE ENCOUNTER
LEFT VOICEMAIL FOR PATIENT THAT HE IS SCHEDULED FOR INJECTION WITH QUINN JONES PA-C ON 1/16/23 @ 3:45 PM    OK FOR HUB TO READ

## 2022-12-09 RX ORDER — FUROSEMIDE 80 MG
80 TABLET ORAL 2 TIMES DAILY
Qty: 180 TABLET | Refills: 0 | Status: SHIPPED | OUTPATIENT
Start: 2022-12-09 | End: 2023-01-11

## 2023-01-03 ENCOUNTER — TELEPHONE (OUTPATIENT)
Dept: CARDIOLOGY | Facility: CLINIC | Age: 68
End: 2023-01-03
Payer: COMMERCIAL

## 2023-01-03 DIAGNOSIS — R06.02 SOB (SHORTNESS OF BREATH) ON EXERTION: Primary | ICD-10-CM

## 2023-01-03 DIAGNOSIS — I34.2 NONRHEUMATIC MITRAL VALVE STENOSIS: ICD-10-CM

## 2023-01-03 RX ORDER — METOLAZONE 2.5 MG/1
2.5 TABLET ORAL DAILY
Qty: 3 TABLET | Refills: 0 | Status: SHIPPED | OUTPATIENT
Start: 2023-01-03 | End: 2023-01-11

## 2023-01-03 NOTE — TELEPHONE ENCOUNTER
He can take metolazone 2.5 mg daily for 3 days only, check BMP and BNP in 1 week  The shortness of breath could be from his heart murmur, lets see if we can get him in for echo before his next appt with Dr Julio

## 2023-01-03 NOTE — TELEPHONE ENCOUNTER
Received VM from patient    HILLARY patient. Patient c/o SOA with exertion. Patient states his legs are not swelling however his pant are getting tighter. Patient has not been weighing. Did advised needed to weight daily.     Patient taking:  Lasix 80 mg BID    Advised I would return call with recommendations

## 2023-01-10 ENCOUNTER — LAB (OUTPATIENT)
Dept: LAB | Facility: HOSPITAL | Age: 68
End: 2023-01-10
Payer: COMMERCIAL

## 2023-01-10 DIAGNOSIS — R06.02 SOB (SHORTNESS OF BREATH) ON EXERTION: ICD-10-CM

## 2023-01-10 LAB
ANION GAP SERPL CALCULATED.3IONS-SCNC: 9.7 MMOL/L (ref 5–15)
BUN SERPL-MCNC: 39 MG/DL (ref 8–23)
BUN/CREAT SERPL: 21.8 (ref 7–25)
CALCIUM SPEC-SCNC: 9.6 MG/DL (ref 8.6–10.5)
CHLORIDE SERPL-SCNC: 96 MMOL/L (ref 98–107)
CO2 SERPL-SCNC: 33.3 MMOL/L (ref 22–29)
CREAT SERPL-MCNC: 1.79 MG/DL (ref 0.76–1.27)
EGFRCR SERPLBLD CKD-EPI 2021: 40.5 ML/MIN/1.73
GLUCOSE SERPL-MCNC: 121 MG/DL (ref 65–99)
NT-PROBNP SERPL-MCNC: 2239 PG/ML (ref 0–900)
POTASSIUM SERPL-SCNC: 3.4 MMOL/L (ref 3.5–5.2)
SODIUM SERPL-SCNC: 139 MMOL/L (ref 136–145)

## 2023-01-10 PROCEDURE — 36415 COLL VENOUS BLD VENIPUNCTURE: CPT

## 2023-01-10 PROCEDURE — 83880 ASSAY OF NATRIURETIC PEPTIDE: CPT

## 2023-01-10 PROCEDURE — 80048 BASIC METABOLIC PNL TOTAL CA: CPT

## 2023-01-11 ENCOUNTER — TELEPHONE (OUTPATIENT)
Dept: CARDIOLOGY | Facility: CLINIC | Age: 68
End: 2023-01-11
Payer: COMMERCIAL

## 2023-01-11 DIAGNOSIS — R06.02 SOB (SHORTNESS OF BREATH) ON EXERTION: ICD-10-CM

## 2023-01-11 DIAGNOSIS — I50.32 DIASTOLIC CHF, CHRONIC: Primary | ICD-10-CM

## 2023-01-11 RX ORDER — SPIRONOLACTONE 25 MG/1
25 TABLET ORAL DAILY
Qty: 90 TABLET | Refills: 3 | Status: SHIPPED | OUTPATIENT
Start: 2023-01-11

## 2023-01-11 RX ORDER — FUROSEMIDE 40 MG/1
40 TABLET ORAL DAILY
Qty: 90 TABLET | Refills: 3 | Status: SHIPPED | OUTPATIENT
Start: 2023-01-11

## 2023-01-11 NOTE — TELEPHONE ENCOUNTER
----- Message from ALEC Pacheco sent at 1/11/2023  2:18 PM EST -----  Notify pt renal function is declined but BNP level is improved. Potassium level is low.   Change lasix to 40 mg daily, add spironolactone 25 mg daily, recheck BMP and BNP in 1 week

## 2023-01-16 ENCOUNTER — CLINICAL SUPPORT (OUTPATIENT)
Dept: ORTHOPEDIC SURGERY | Facility: CLINIC | Age: 68
End: 2023-01-16
Payer: MEDICARE

## 2023-01-16 VITALS — HEIGHT: 69 IN | WEIGHT: 198 LBS | BODY MASS INDEX: 29.33 KG/M2

## 2023-01-16 DIAGNOSIS — M25.511 RIGHT SHOULDER PAIN, UNSPECIFIED CHRONICITY: ICD-10-CM

## 2023-01-16 DIAGNOSIS — M17.31 POST-TRAUMATIC OSTEOARTHRITIS OF RIGHT KNEE: Primary | ICD-10-CM

## 2023-01-16 PROCEDURE — 20610 DRAIN/INJ JOINT/BURSA W/O US: CPT | Performed by: PHYSICIAN ASSISTANT

## 2023-01-16 RX ORDER — LIDOCAINE HYDROCHLORIDE 20 MG/ML
2 INJECTION, SOLUTION EPIDURAL; INFILTRATION; INTRACAUDAL; PERINEURAL
Status: COMPLETED | OUTPATIENT
Start: 2023-01-16 | End: 2023-01-16

## 2023-01-16 RX ORDER — METHYLPREDNISOLONE ACETATE 80 MG/ML
160 INJECTION, SUSPENSION INTRA-ARTICULAR; INTRALESIONAL; INTRAMUSCULAR; SOFT TISSUE
Status: COMPLETED | OUTPATIENT
Start: 2023-01-16 | End: 2023-01-16

## 2023-01-16 RX ADMIN — METHYLPREDNISOLONE ACETATE 160 MG: 80 INJECTION, SUSPENSION INTRA-ARTICULAR; INTRALESIONAL; INTRAMUSCULAR; SOFT TISSUE at 14:59

## 2023-01-16 RX ADMIN — LIDOCAINE HYDROCHLORIDE 2 ML: 20 INJECTION, SOLUTION EPIDURAL; INFILTRATION; INTRACAUDAL; PERINEURAL at 14:58

## 2023-01-16 RX ADMIN — METHYLPREDNISOLONE ACETATE 160 MG: 80 INJECTION, SUSPENSION INTRA-ARTICULAR; INTRALESIONAL; INTRAMUSCULAR; SOFT TISSUE at 14:58

## 2023-01-16 RX ADMIN — LIDOCAINE HYDROCHLORIDE 2 ML: 20 INJECTION, SOLUTION EPIDURAL; INFILTRATION; INTRACAUDAL; PERINEURAL at 14:59

## 2023-01-16 NOTE — PROGRESS NOTES
"Chief Complaint  Follow-up of the Right Shoulder and Follow-up of the Right Knee    Subjective    History of Present Illness      Dennis Trinh Jr. is a 67 y.o. male who presents to [facility] for is here today for injection therapy. He receives  RIGHT knee and RIGHT shoulder injections of steroid. Since last injection in the knee, patient notes substantial relief of symptoms. He has not been seen for the right shoulder for quite some time.  He reports pain with overhead activities.  Treatment options have been discussed and he understands and consents.       Objective   Vital Signs:   Ht 175.3 cm (69\")   Wt 89.8 kg (198 lb)   BMI 29.24 kg/m²     Physical Exam  67 y.o. male is awake, alert, oriented, in no acute distress and well developed, well nourished.  Ortho Exam   RIGHT knee  There is Mild joint line tenderness at the medial aspect of the knee.   Positive for varus orientation of the knee.   Positive for crepitus throughout range of motion.   Negative for effusion.  Positive patellar grind test.   Negative Lachman test.    Negative anterior and posterior drawer.  Range of motion in extension and flexion is: 0-115 degrees.  Neurovascular status is intact.    Dorsalis pedis and posterior tibial artery pulses are palpable.    Common peroneal nerve function is well preserved.  Gait is cautious and antalgic.     RIGHT shoulder  Positive for significant tenderness at the anterior aspect of the shoulder and at the insertion of the rotator cuff over the greater tuberosity of the humerus.   Positive for tenderness with palpation of the AC joint.  Soft tissue tenderness is noted.   Slight proximal migration of the humeral head is noted.   Forward flexion is 0-110 degrees, abduction is 0-45 degrees.  Positive for decreased strength in abduction and external rotation.   Crossover adduction test is positive.    Drop arm sign is positive.  Sulcus sign is negative.    Neer test is positive on " "Chief Complaint  Nasal Congestion, Earache, and Back Pain    Subjective          Antione Douglas presents to [facility]  History of Present Illness     Protracted upper respiratory infection with left otitis media  The patient states that he has had a sinus infection and ear pain for nearly a month. The patient states that he was called in 2 antibiotics, omnicef and amoxicillin. he states that they helped some. He reports that he has been taking Mucinex as well. The patient denies having a fever. He states that the hill have yellow mucus when he blows his nose. The patient denies being around anyone who is sick. He reports that he still has a productive cough, however, it is not as bad as it was.     Lumbar back pain with bilateral leg pain  The patient states that he has had back pain for years. He reports that it has been getting worse in the last 6 months. Patient denies trouble urinating or any blood in his urine. He reports that he told his previous provider and urine test were performed, this approximately 2 years ago. he denies having diarrhea. The patient states that it is not painful to push on his back, however it aches constantly. He denies having any x-rays done on his back. He states that he has pain in the back of his bilateral legs.    Diabetes mellitus type 2   Patient states that he is checking his glucose regularly and his levels are averaging about 130.      Review of Systems   All other systems reviewed and are negative.       Objective       Vital Signs:   /72   Pulse 105   Temp 97.8 °F (36.6 °C)   Resp 18   Ht 175.3 cm (69\")   Wt 89.8 kg (198 lb)   SpO2 97%   BMI 29.24 kg/m²     Physical Exam  Vitals and nursing note reviewed.   Constitutional:       Appearance: He is well-developed.   HENT:      Head: Normocephalic and atraumatic.      Right Ear: External ear normal. Tympanic membrane is retracted. Tympanic membrane is not erythematous.      Left " Ear: External ear normal. Tympanic membrane is retracted. Tympanic membrane is not erythematous.      Nose:      Right Sinus: Maxillary sinus tenderness present.      Left Sinus: Maxillary sinus tenderness present.   Eyes:      Pupils: Pupils are equal, round, and reactive to light.   Cardiovascular:      Rate and Rhythm: Normal rate and regular rhythm.      Heart sounds: Normal heart sounds.   Pulmonary:      Effort: Pulmonary effort is normal. No respiratory distress.      Breath sounds: Normal breath sounds. No wheezing or rales.   Musculoskeletal:      Lumbar back: Tenderness present. Negative right straight leg raise test and negative left straight leg raise test.   Skin:     General: Skin is warm and dry.   Neurological:      Mental Status: He is alert and oriented to person, place, and time.   Psychiatric:         Behavior: Behavior normal.          Result Review :                     Assessment and Plan    Diagnoses and all orders for this visit:    1. Protracted URI (Primary)  -     doxycycline (VIBRAMYCIN) 100 MG capsule; Take 1 capsule by mouth 2 (Two) Times a Day.  Dispense: 20 capsule; Refill: 0  -     predniSONE (DELTASONE) 10 MG tablet; 4 po x 2 days then 3 po daily x 2 days then 2 po daily x 2 days then 1 po daily x 2 days then stop.  Dispense: 20 tablet; Refill: 0    2. Non-recurrent acute suppurative otitis media of left ear without spontaneous rupture of tympanic membrane  -     doxycycline (VIBRAMYCIN) 100 MG capsule; Take 1 capsule by mouth 2 (Two) Times a Day.  Dispense: 20 capsule; Refill: 0  -     predniSONE (DELTASONE) 10 MG tablet; 4 po x 2 days then 3 po daily x 2 days then 2 po daily x 2 days then 1 po daily x 2 days then stop.  Dispense: 20 tablet; Refill: 0    3. Chronic bilateral low back pain with bilateral sciatica  -     XR Spine Lumbar 2 or 3 View; Future  -     predniSONE (DELTASONE) 10 MG tablet; 4 po x 2 days then 3 po daily x 2 days then 2 po daily x 2 days then 1 po daily x 2  compression.  There is no evidence of multidirectional instability.          PROCEDURE  Large Joint Arthrocentesis: R knee  Date/Time: 1/16/2023 2:58 PM  Consent given by: patient  Site marked: site marked  Timeout: Immediately prior to procedure a time out was called to verify the correct patient, procedure, equipment, support staff and site/side marked as required   Supporting Documentation  Indications: pain   Procedure Details  Location: knee - R knee  Preparation: Patient was prepped and draped in the usual sterile fashion  Needle size: 25 G  Approach: anteromedial  Medications administered: 160 mg methylPREDNISolone acetate 80 MG/ML; 2 mL lidocaine PF 2% 2 %  Patient tolerance: patient tolerated the procedure well with no immediate complications    Large Joint Arthrocentesis: R glenohumeral  Date/Time: 1/16/2023 2:59 PM  Consent given by: patient  Site marked: site marked  Timeout: Immediately prior to procedure a time out was called to verify the correct patient, procedure, equipment, support staff and site/side marked as required   Supporting Documentation  Indications: pain   Procedure Details  Location: shoulder - R glenohumeral  Preparation: Patient was prepped and draped in the usual sterile fashion  Needle size: 25 G  Approach: anteromedial  Medications administered: 160 mg methylPREDNISolone acetate 80 MG/ML; 2 mL lidocaine PF 2% 2 %  Patient tolerance: patient tolerated the procedure well with no immediate complications        Injection site was identified by physical examination and cleaned with Betadine and alcohol swabs. Prior to needle insertion, ethyl chloride spray was used for surface anesthesia. Sterile technique was used.       Assessment   Assessment and Plan    Problem List Items Addressed This Visit        Musculoskeletal and Injuries    Right shoulder pain    Relevant Orders    Large Joint Arthrocentesis: R glenohumeral    Post-traumatic osteoarthritis of right knee - Primary    Relevant  Orders    Large Joint Arthrocentesis: R knee       Follow Up   • Right knee depo medrol injection and right shoulder depomedrol injection was discussed with the patient. Discussed indication, risks, benefits, and alternatives. Verbal consent was given to proceed with the procedure. Patient tolerated the procedure well and no complications were encountered.  • Discussion of orthopedic goals and activities and patient/guardian expressed understanding.  • Ice, heat, rest, compression and elevation of extremity as beneficial  • nsaids and/or tylenol as beneficial  • Instructed to refrain from heavy activity/rest the extremity for the next 24-48 hours  • Discussion regarding possibility of cortisol flare and what to expect if this occurs  • Watch for signs and symptoms of infection  • Call if adverse effect from injection therapy  • Follow up in 3 months.    • Patient was given instructions and counseling regarding his condition or for health maintenance advice. Please see specific information pulled into the AVS if appropriate.     Kirt Guthrie PA-C   Date of Encounter: 1/16/2023     Electronically signed by Kirt Guthrie PA-C, 01/16/23, 3:23 PM EST.     LIZ Dragon/Transcription disclaimer:  Much of this encounter note is an electronic transcription/translation of spoken language to printed text. The electronic translation of spoken language may permit erroneous, or at times, nonsensical words or phrases to be inadvertently transcribed; Although I have reviewed the note for such errors, some may still exist.    days then stop.  Dispense: 20 tablet; Refill: 0              1. Protracted upper respiratory infection with left otitis media        - Start doxycycline 100 mg twice a day and prednisone taper over 8 days. Side effects explained for the prednisone and recommend that he take in the morning with food. Recommend with the antibiotic to avoid excessive sunlight and to make sure he takes with a full glass of water call or follow up if not improving.     2. Lumbar back pain with bilateral leg pain        - He has had this for quite some time and has gotten worse over the last 6 months. He has not had an x-ray in many years, so we will start with an x-ray and then proceed with either MRI or CT scan depending findings. We will try the prednisone as well on that.      DISCUSSION        Follow Up   Return if symptoms worsen or fail to improve, for follow up depends on review of labs and testing.    Patient was given instructions and counseling regarding his condition or for health maintenance advice. Please see specific information pulled into the AVS if appropriate.       Rafa Gómez MD   Transcribed from ambient dictation for Rafa Gómez MD by Dalila Thompson.  03/29/22   17:40 EDT    Patient verbalized consent to the visit recording.  I have personally performed the services described in this document as transcribed by the above individual, and it is both accurate and complete.  Rafa Gómez MD  3/30/2022  15:22 EDT

## 2023-01-18 ENCOUNTER — LAB (OUTPATIENT)
Dept: LAB | Facility: HOSPITAL | Age: 68
End: 2023-01-18
Payer: COMMERCIAL

## 2023-01-18 DIAGNOSIS — R06.02 SOB (SHORTNESS OF BREATH) ON EXERTION: ICD-10-CM

## 2023-01-18 DIAGNOSIS — I50.32 DIASTOLIC CHF, CHRONIC: ICD-10-CM

## 2023-01-18 PROCEDURE — 80048 BASIC METABOLIC PNL TOTAL CA: CPT

## 2023-01-18 PROCEDURE — 83880 ASSAY OF NATRIURETIC PEPTIDE: CPT

## 2023-01-18 PROCEDURE — 36415 COLL VENOUS BLD VENIPUNCTURE: CPT

## 2023-01-19 ENCOUNTER — TELEPHONE (OUTPATIENT)
Dept: CARDIOLOGY | Facility: CLINIC | Age: 68
End: 2023-01-19
Payer: COMMERCIAL

## 2023-01-19 DIAGNOSIS — I50.32 DIASTOLIC CHF, CHRONIC: Primary | ICD-10-CM

## 2023-01-19 LAB
ANION GAP SERPL CALCULATED.3IONS-SCNC: 6 MMOL/L (ref 5–15)
BUN SERPL-MCNC: 38 MG/DL (ref 8–23)
BUN/CREAT SERPL: 26.4 (ref 7–25)
CALCIUM SPEC-SCNC: 9.6 MG/DL (ref 8.6–10.5)
CHLORIDE SERPL-SCNC: 106 MMOL/L (ref 98–107)
CO2 SERPL-SCNC: 26 MMOL/L (ref 22–29)
CREAT SERPL-MCNC: 1.44 MG/DL (ref 0.76–1.27)
EGFRCR SERPLBLD CKD-EPI 2021: 52.3 ML/MIN/1.73
GLUCOSE SERPL-MCNC: 102 MG/DL (ref 65–99)
NT-PROBNP SERPL-MCNC: 6503 PG/ML (ref 0–900)
POTASSIUM SERPL-SCNC: 5.1 MMOL/L (ref 3.5–5.2)
SODIUM SERPL-SCNC: 138 MMOL/L (ref 136–145)

## 2023-01-19 RX ORDER — FUROSEMIDE 20 MG/1
20 TABLET ORAL DAILY
Qty: 90 TABLET | Refills: 3 | Status: SHIPPED | OUTPATIENT
Start: 2023-01-19

## 2023-01-19 NOTE — TELEPHONE ENCOUNTER
----- Message from ALEC Pacheco sent at 1/19/2023  8:28 AM EST -----  Notify pt renal function is improved but BNP level is elevated  He can change lasix to 60 mg daily and continue current dose of spironolactone. Repeat BMP and BNP in 2 weeks.

## 2023-01-21 NOTE — PROGRESS NOTES
[facility]  Cardiology progress Note    Patient Name: Dennis Trinh Jr.  : 1955    CHIEF COMPLAINT  Atrial fibrillation        Subjective   Subjective     HISTORY OF PRESENT ILLNESS    Dennis Trinh Jr. is a 68 y.o. male with history of atrial fibrillation valvular disease.  No palpitations.  No chest pain.  He simply had increased shortness of breath that improved with extra Lasix.    REVIEW OF SYSTEMS    Constitutional:    No fever, no weight loss  Skin:     No rash  Otolaryngeal:    No difficulty swallowing  Cardiovascular: See HPI.  Pulmonary:    No cough, no sputum production    Personal History     Social History:    reports that he has never smoked. He has never used smokeless tobacco. He reports that he does not drink alcohol and does not use drugs.    Home Medications:  Current Outpatient Medications on File Prior to Visit   Medication Sig   • apixaban (ELIQUIS) 5 MG tablet tablet Take 1 tablet by mouth 2 (Two) Times a Day.   • furosemide (Lasix) 20 MG tablet Take 1 tablet by mouth Daily. Patient to take along with Lasix 40 mg tab   • furosemide (LASIX) 40 MG tablet Take 1 tablet by mouth Daily.   • losartan (COZAAR) 100 MG tablet Take 1 tablet by mouth Daily.   • metoprolol succinate XL (TOPROL-XL) 50 MG 24 hr tablet Take 1 tablet by mouth Daily.   • potassium chloride 10 MEQ CR tablet Take 2 tablets by mouth Daily. (Patient taking differently: Take 20 mEq by mouth 3 (Three) Times a Day.)   • sildenafil (VIAGRA) 50 MG tablet TAKE ONE TABLET BY MOUTH TWO hours prior TO sexual activity   • spironolactone (ALDACTONE) 25 MG tablet Take 1 tablet by mouth Daily.   • [DISCONTINUED] fexofenadine (ALLEGRA) 180 MG tablet    • [DISCONTINUED] hydrochlorothiazide (HYDRODIURIL) 25 MG tablet      No current facility-administered medications on file prior to visit.       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     PHYSICAL EXAM:    General Appearance:   · well developed  · well nourished  HENT:   · oropharynx moist  · lips not cyanotic  Neck:  · thyroid not enlarged  · supple  Respiratory:  · no respiratory distress  · normal breath sounds  · no rales  Cardiovascular:  · no jugular venous distention  · regular rhythm  · apical impulse normal  · S1 normal, S2 normal  · no S3, no S4   · no murmur  · no rub, no thrill  · carotid pulses normal; no bruit  · pedal pulses normal  · lower extremity edema: none    Skin:   · warm, dry  Psychiatric:  · judgement and insight appropriate  · normal mood and affect        Result Review:  I have personally reviewed the available results from  [x]  Laboratory  [x]  EKG  [x]  Cardiology  [x]  Medications  [x]  Old records  []  Other:     Procedures  Results for orders placed in visit on 01/20/23    Adult Transthoracic Echo Complete w/ Color, Spectral and Contrast if necessary per protocol    Interpretation Summary  Fibrocalcific mitral and aortic valve.  Mitral valve prolapse noted.  Moderate to moderately severe mitral regurgitation which is an eccentric jet..  Mild mitral stenosis.  Mild tricuspid regurgitation.     Impression/Plan:  1.  Chronic diastolic heart failure stable: Continue Lasix 60 mg a day.  Monitor BMP.  Salt diet and fluid restriction advised.  2.  Moderate severe MR/mitral stenosis: Cussed with the patient results of recent echo.  3.  Essential hypertension controlled: Continue losartan 100 mg twice a day.  4.  Permanent atrial fibrillation with a controlled heart rate: Continue Eliquis 5 mg twice a day for stroke prevention.  Continue Toprol-XL 50 mg a day for rate control.           Chase Julio MD   01/23/23   13:42 EST

## 2023-01-23 ENCOUNTER — OFFICE VISIT (OUTPATIENT)
Dept: CARDIOLOGY | Facility: CLINIC | Age: 68
End: 2023-01-23
Payer: MEDICARE

## 2023-01-23 VITALS
SYSTOLIC BLOOD PRESSURE: 123 MMHG | DIASTOLIC BLOOD PRESSURE: 80 MMHG | HEIGHT: 70 IN | BODY MASS INDEX: 27.49 KG/M2 | WEIGHT: 192 LBS | HEART RATE: 95 BPM

## 2023-01-23 DIAGNOSIS — I10 HYPERTENSION, ESSENTIAL: ICD-10-CM

## 2023-01-23 DIAGNOSIS — I34.0 NONRHEUMATIC MITRAL VALVE REGURGITATION: ICD-10-CM

## 2023-01-23 DIAGNOSIS — I50.32 DIASTOLIC CHF, CHRONIC: ICD-10-CM

## 2023-01-23 DIAGNOSIS — I48.21 PERMANENT ATRIAL FIBRILLATION: Primary | ICD-10-CM

## 2023-01-23 PROCEDURE — 99214 OFFICE O/P EST MOD 30 MIN: CPT | Performed by: SPECIALIST

## 2023-02-02 ENCOUNTER — LAB (OUTPATIENT)
Dept: LAB | Facility: HOSPITAL | Age: 68
End: 2023-02-02
Payer: COMMERCIAL

## 2023-02-02 DIAGNOSIS — I50.32 DIASTOLIC CHF, CHRONIC: ICD-10-CM

## 2023-02-02 LAB
ANION GAP SERPL CALCULATED.3IONS-SCNC: 7.3 MMOL/L (ref 5–15)
BUN SERPL-MCNC: 31 MG/DL (ref 8–23)
BUN/CREAT SERPL: 19.6 (ref 7–25)
CALCIUM SPEC-SCNC: 9.2 MG/DL (ref 8.6–10.5)
CHLORIDE SERPL-SCNC: 104 MMOL/L (ref 98–107)
CO2 SERPL-SCNC: 28.7 MMOL/L (ref 22–29)
CREAT SERPL-MCNC: 1.58 MG/DL (ref 0.76–1.27)
EGFRCR SERPLBLD CKD-EPI 2021: 46.8 ML/MIN/1.73
GLUCOSE SERPL-MCNC: 109 MG/DL (ref 65–99)
NT-PROBNP SERPL-MCNC: 1872 PG/ML (ref 0–900)
POTASSIUM SERPL-SCNC: 5 MMOL/L (ref 3.5–5.2)
SODIUM SERPL-SCNC: 140 MMOL/L (ref 136–145)

## 2023-02-02 PROCEDURE — 83880 ASSAY OF NATRIURETIC PEPTIDE: CPT

## 2023-02-02 PROCEDURE — 36415 COLL VENOUS BLD VENIPUNCTURE: CPT

## 2023-02-02 PROCEDURE — 80048 BASIC METABOLIC PNL TOTAL CA: CPT

## 2023-02-06 ENCOUNTER — TELEPHONE (OUTPATIENT)
Dept: CARDIOLOGY | Facility: CLINIC | Age: 68
End: 2023-02-06
Payer: COMMERCIAL

## 2023-02-06 DIAGNOSIS — I50.33 ACUTE ON CHRONIC DIASTOLIC CHF (CONGESTIVE HEART FAILURE): Primary | ICD-10-CM

## 2023-02-06 NOTE — TELEPHONE ENCOUNTER
PATIENT CALLED REQUESTING A CD OF ECHO DONE WITH DR. DOWELL for [facility] and wants a  call when it is ready.

## 2023-02-06 NOTE — TELEPHONE ENCOUNTER
----- Message from Kathy Lo sent at 2/3/2023  9:18 AM EST -----    ----- Message -----  From: Chantal Lugo APRN  Sent: 2/3/2023   8:49 AM EST  To: Kathy Lo    Notify pt BNP level is improved, renal function is stable, sodium and potassium are in normal range, continue current meds. Repeat BMP and BNP in 4 weeks

## 2023-02-14 RX ORDER — POTASSIUM CHLORIDE 750 MG/1
TABLET, FILM COATED, EXTENDED RELEASE ORAL
Qty: 180 TABLET | Refills: 0 | Status: SHIPPED | OUTPATIENT
Start: 2023-02-14 | End: 2023-02-14

## 2023-02-14 RX ORDER — POTASSIUM CHLORIDE 750 MG/1
20 TABLET, FILM COATED, EXTENDED RELEASE ORAL DAILY
Qty: 180 TABLET | Refills: 3 | Status: SHIPPED | OUTPATIENT
Start: 2023-02-14

## 2023-02-22 RX ORDER — METOPROLOL SUCCINATE 50 MG/1
TABLET, EXTENDED RELEASE ORAL
Qty: 90 TABLET | Refills: 3 | Status: SHIPPED | OUTPATIENT
Start: 2023-02-22

## 2023-03-06 ENCOUNTER — TELEPHONE (OUTPATIENT)
Dept: CARDIOLOGY | Facility: CLINIC | Age: 68
End: 2023-03-06
Payer: COMMERCIAL

## 2023-03-06 NOTE — TELEPHONE ENCOUNTER
Received VM from patient    SW patient. Patient states he seen Dr Solano at . Patient had heart cath that showed multiple blockages. Patient wanted to let up know that on 3/13 he will be having bypass surgery and a valve replacement.

## 2023-03-09 ENCOUNTER — LAB (OUTPATIENT)
Dept: LAB | Facility: HOSPITAL | Age: 68
End: 2023-03-09
Payer: COMMERCIAL

## 2023-03-09 DIAGNOSIS — I50.33 ACUTE ON CHRONIC DIASTOLIC CHF (CONGESTIVE HEART FAILURE): ICD-10-CM

## 2023-03-09 LAB
ANION GAP SERPL CALCULATED.3IONS-SCNC: 8 MMOL/L (ref 5–15)
BUN SERPL-MCNC: 19 MG/DL (ref 8–23)
BUN/CREAT SERPL: 13.7 (ref 7–25)
CALCIUM SPEC-SCNC: 9 MG/DL (ref 8.6–10.5)
CHLORIDE SERPL-SCNC: 103 MMOL/L (ref 98–107)
CO2 SERPL-SCNC: 27 MMOL/L (ref 22–29)
CREAT SERPL-MCNC: 1.39 MG/DL (ref 0.76–1.27)
EGFRCR SERPLBLD CKD-EPI 2021: 54.5 ML/MIN/1.73
GLUCOSE SERPL-MCNC: 115 MG/DL (ref 65–99)
NT-PROBNP SERPL-MCNC: 2229 PG/ML (ref 0–900)
POTASSIUM SERPL-SCNC: 4.7 MMOL/L (ref 3.5–5.2)
SODIUM SERPL-SCNC: 138 MMOL/L (ref 136–145)

## 2023-03-09 PROCEDURE — 83880 ASSAY OF NATRIURETIC PEPTIDE: CPT

## 2023-03-09 PROCEDURE — 80048 BASIC METABOLIC PNL TOTAL CA: CPT

## 2023-03-09 PROCEDURE — 36415 COLL VENOUS BLD VENIPUNCTURE: CPT

## 2023-03-10 ENCOUNTER — TELEPHONE (OUTPATIENT)
Dept: CARDIOLOGY | Facility: CLINIC | Age: 68
End: 2023-03-10
Payer: COMMERCIAL

## 2023-03-10 NOTE — TELEPHONE ENCOUNTER
HILLARY patient regarding results and recommendations. Voiced understanding.   Patient denies any SOA or swelling. Advised to notify office if develop any SOA or swelling.

## 2023-03-10 NOTE — TELEPHONE ENCOUNTER
----- Message from ALEC Pacheco sent at 3/10/2023  7:49 AM EST -----  Renal function is improved, sodium and potassium are in normal range. BNP is slightly elevated. Find out if he is having worsening shortness of breath or edema

## 2023-04-19 ENCOUNTER — CLINICAL SUPPORT (OUTPATIENT)
Dept: ORTHOPEDIC SURGERY | Facility: CLINIC | Age: 68
End: 2023-04-19
Payer: MEDICARE

## 2023-04-19 VITALS — BODY MASS INDEX: 25.18 KG/M2 | HEIGHT: 69 IN | TEMPERATURE: 98.9 F | WEIGHT: 170 LBS

## 2023-04-19 DIAGNOSIS — M17.31 POST-TRAUMATIC OSTEOARTHRITIS OF RIGHT KNEE: Primary | ICD-10-CM

## 2023-04-19 DIAGNOSIS — M70.61 GREATER TROCHANTERIC BURSITIS OF RIGHT HIP: ICD-10-CM

## 2023-04-19 NOTE — PROGRESS NOTES
"Chief Complaint  Follow-up and Pain of the Right Knee and Follow-up and Pain of the Right Hip    Subjective    History of Present Illness      Dennis Trinh Jr. is a 68 y.o. male who presents to [facility] for is here today for injection therapy. He receives  RIGHT knee and RIGHT hip injections of steroid. Since last injection in the knee, patient notes substantial relief of symptoms.  Treatment options have been discussed and he understands and consents. He underwent triple bypass 5 weeks ago and has also suffered from ulcers and spent 14 days in the hospital.      Objective   Vital Signs:   Temp 98.9 °F (37.2 °C)   Ht 175.3 cm (69\")   Wt 77.1 kg (170 lb)   BMI 25.10 kg/m²     Physical Exam  68 y.o. male is awake, alert, oriented, in no acute distress and well developed, well nourished.  Ortho Exam   RIGHT knee  There is Mild joint line tenderness at the medial aspect of the knee.   Positive for varus orientation of the knee.   Positive for crepitus throughout range of motion.   Negative for effusion.  Positive patellar grind test.   Negative Lachman test.    Negative anterior and posterior drawer.  Range of motion in extension and flexion is: 0-115 degrees.  Neurovascular status is intact.    Dorsalis pedis and posterior tibial artery pulses are palpable.    Common peroneal nerve function is well preserved.  Gait is cautious and antalgic.     RIGHT shoulder  Positive for significant tenderness at the anterior aspect of the shoulder and at the insertion of the rotator cuff over the greater tuberosity of the humerus.   Positive for tenderness with palpation of the AC joint.  Soft tissue tenderness is noted.   Slight proximal migration of the humeral head is noted.   Forward flexion is 0-110 degrees, abduction is 0-45 degrees.  Positive for decreased strength in abduction and external rotation.   Crossover adduction test is positive.    Drop arm sign is positive.  Sulcus sign is " negative.    Neer test is positive on compression.  There is no evidence of multidirectional instability.          PROCEDURE  - Large Joint Arthrocentesis: R knee on 4/21/2023 3:50 PM  Indications: pain  Details: 25 G needle, anteromedial approach  Medications: 160 mg methylPREDNISolone acetate 80 MG/ML; 2 mL lidocaine PF 1% 1 %  Outcome: tolerated well, no immediate complications  Procedure, treatment alternatives, risks and benefits explained, specific risks discussed. Consent was given by the patient. Immediately prior to procedure a time out was called to verify the correct patient, procedure, equipment, support staff and site/side marked as required. Patient was prepped and draped in the usual sterile fashion.     - Large Joint Arthrocentesis: R greater trochanteric bursa on 4/21/2023 3:51 PM  Indications: pain  Details: 22 G needle, lateral approach  Medications: 160 mg methylPREDNISolone acetate 80 MG/ML; 2 mL lidocaine PF 1% 1 %  Outcome: tolerated well, no immediate complications  Procedure, treatment alternatives, risks and benefits explained, specific risks discussed. Consent was given by the patient. Immediately prior to procedure a time out was called to verify the correct patient, procedure, equipment, support staff and site/side marked as required. Patient was prepped and draped in the usual sterile fashion.         Injection site was identified by physical examination and cleaned with Betadine and alcohol swabs. Prior to needle insertion, ethyl chloride spray was used for surface anesthesia. Sterile technique was used.       Assessment   Assessment and Plan    Problem List Items Addressed This Visit        Musculoskeletal and Injuries    Post-traumatic osteoarthritis of right knee - Primary    Relevant Orders    - Large Joint Arthrocentesis: R knee    Greater trochanteric bursitis of right hip    Relevant Orders    - Large Joint Arthrocentesis: R greater trochanteric bursa       Follow Up    • Discussed with [facility] regarding performing injections 5 weeks following bypass surgery. Dr. Mcclure advised as long as the wound is healed we can proceed with the injections.  • Right knee depo medrol injection and right hip-GTB depomedrol injection was discussed with the patient. Discussed indication, risks, benefits, and alternatives. Verbal consent was given to proceed with the procedure. Patient tolerated the procedure well and no complications were encountered.  • Discussion of orthopedic goals and activities and patient/guardian expressed understanding.  • Ice, heat, rest, compression and elevation of extremity as beneficial  • nsaids and/or tylenol as beneficial  • Instructed to refrain from heavy activity/rest the extremity for the next 24-48 hours  • Discussion regarding possibility of cortisol flare and what to expect if this occurs  • Watch for signs and symptoms of infection  • Call if adverse effect from injection therapy  • Follow up in 3 months    • Patient was given instructions and counseling regarding his condition or for health maintenance advice. Please see specific information pulled into the AVS if appropriate.     Kirt Guthrie PA-C   Date of Encounter: 4/19/2023   Electronically signed by Kirt Guthrie PA-C, 04/21/23, 3:50 PM EDT.       EMR Dragon/Transcription disclaimer:  Much of this encounter note is an electronic transcription/translation of spoken language to printed text. The electronic translation of spoken language may permit erroneous, or at times, nonsensical words or phrases to be inadvertently transcribed; Although I have reviewed the note for such errors, some may still exist.

## 2023-04-21 RX ORDER — LIDOCAINE HYDROCHLORIDE 10 MG/ML
2 INJECTION, SOLUTION EPIDURAL; INFILTRATION; INTRACAUDAL; PERINEURAL
Status: COMPLETED | OUTPATIENT
Start: 2023-04-21 | End: 2023-04-21

## 2023-04-21 RX ORDER — METHYLPREDNISOLONE ACETATE 80 MG/ML
160 INJECTION, SUSPENSION INTRA-ARTICULAR; INTRALESIONAL; INTRAMUSCULAR; SOFT TISSUE
Status: COMPLETED | OUTPATIENT
Start: 2023-04-21 | End: 2023-04-21

## 2023-04-21 RX ADMIN — METHYLPREDNISOLONE ACETATE 160 MG: 80 INJECTION, SUSPENSION INTRA-ARTICULAR; INTRALESIONAL; INTRAMUSCULAR; SOFT TISSUE at 15:51

## 2023-04-21 RX ADMIN — METHYLPREDNISOLONE ACETATE 160 MG: 80 INJECTION, SUSPENSION INTRA-ARTICULAR; INTRALESIONAL; INTRAMUSCULAR; SOFT TISSUE at 15:50

## 2023-04-21 RX ADMIN — LIDOCAINE HYDROCHLORIDE 2 ML: 10 INJECTION, SOLUTION EPIDURAL; INFILTRATION; INTRACAUDAL; PERINEURAL at 15:51

## 2023-04-21 RX ADMIN — LIDOCAINE HYDROCHLORIDE 2 ML: 10 INJECTION, SOLUTION EPIDURAL; INFILTRATION; INTRACAUDAL; PERINEURAL at 15:50

## 2023-05-05 NOTE — PROGRESS NOTES
FOLLOW UP VISIT    Patient: Dennis Trinh Jr.  ?  YOB: 1955    MRN: [id number]  ?  Chief Complaint   Patient presents with   • Pelvis - Follow-up, Pain   • Right Shoulder - Follow-up, Pain      ?  HPI:   Dennis Rivas Jr.      Pain Location: RIGHT hip  Radiation: none  Quality: dull, aching  Intensity/Severity: moderate  Duration: 1.5 years  Onset quality: sudden after he was struck by the front end of a truck in a Workmen's Compensation related injury.  Timing: intermittent  Aggravating Factors: going up and down stairs, kneeling  Alleviating Factors: NSAIDs, steroid injection (intra-articular)  Previous Episodes: none mentioned  Associated Symptoms: pain, decreased ROM, decreased strength  ADLs Affected: ambulating, work related activities, recreational activities/sports  Previous Treatment: Anti-inflammatory medication and steroid injections to the greater trochanteric bursa of the right hip.    This patient is an established patient.  This problem is not new to this examiner.      Allergies: No Known Allergies    Medications:   Home Medications:  Current Outpatient Medications on File Prior to Visit   Medication Sig   • Diclofenac Sodium (PENNSAID) 2 % solution Apply two pumps to the affected area twice a day prn pain   • hydrochlorothiazide (HYDRODIURIL) 25 MG tablet    • HYDROcodone-acetaminophen (NORCO) 5-325 MG per tablet    • losartan (COZAAR) 50 MG tablet    • montelukast (SINGULAIR) 10 MG tablet    • sildenafil (VIAGRA) 100 MG tablet 1/2 TO 1 TABLET BY MOUTH EVERY DAY AS NEEDED   • traZODone (DESYREL) 50 MG tablet      No current facility-administered medications on file prior to visit.      Current Medications:  Scheduled Meds:  PRN Meds:.    I have reviewed the patient's medical history in detail and updated the computerized patient record.  Review and summarization of old records include:    No past medical history on file.  No past surgical history on file.  Social History  "    Occupational History   • Not on file   Tobacco Use   • Smoking status: Never Smoker   • Smokeless tobacco: Never Used   Substance and Sexual Activity   • Alcohol use: No   • Drug use: Defer   • Sexual activity: Not on file      No family history on file.      Review of Systems       Wt Readings from Last 3 Encounters:   03/19/20 99.3 kg (219 lb)   12/12/19 103 kg (227 lb)   11/08/18 97.5 kg (215 lb)     Ht Readings from Last 3 Encounters:   03/19/20 176.5 cm (69.5\")   12/12/19 179.1 cm (70.5\")   11/08/18 179.1 cm (70.5\")     There is no height or weight on file to calculate BMI.  No height and weight on file for this encounter.  There were no vitals filed for this visit.      Physical Exam  Constitutional: Patient is oriented to person, place, and time. Appears well-developed and well-nourished.   HENT:   Head: Normocephalic and atraumatic.   Eyes: Conjunctivae and EOM are normal. Pupils are equal, round, and reactive to light.   Cardiovascular: Normal rate, regular rhythm, normal heart sounds and intact distal pulses.   Pulmonary/Chest: Effort normal and breath sounds normal.   Musculoskeletal:   See detailed exam below   Neurological: Alert and oriented to person, place, and time. No sensory deficit. Coordination normal.   Skin: Skin is warm and dry. Capillary refill takes less than 2 seconds. No rash noted. No erythema.   Psychiatric: Patient has a normal mood and affect. His behavior is normal. Judgment and thought content normal.   Nursing note and vitals reviewed.      Ortho Exam:   Right hip (gtb): Skin and soft tissues over the greater trochanteric bursa are tender upon palpation, along with IT band tenderness. Cross body adduction is negative. Internal and external rotations are bothersome and cause tenderness over the greater trochanter. There is no clinical deformity and no shortening. He does have a limp upon walking. There is piriformis tightness and there  is capsular tightness with any rotation. " Dorsalis pedis and posterior tibial artery pulses are palpable. Neurovascular status is intact and capillary refill is less than 2 seconds. Common peroneal nerve function is well preserved.        Diagnostics:  right Hip X-Ray  Indication: Evaluation of pain and discomfort following an injury to the hip.  AP and lateral  Findings: Fairly well-maintained contour of the hip joint without any evidence of posttraumatic avascular necrosis.  no bony lesion  Soft tissues within normal limits  within normal limits joint spaces  Hardware appropriately positioned not applicable      yes prior studies available for comparison.    X-RAY was ordered and reviewed by Jewel Mcclure MD        Assessment:  Dennis was seen today for follow-up, pain, follow-up and pain.    Diagnoses and all orders for this visit:    Pubic ramus fracture, right, closed, initial encounter (CMS/[facility])  -     XR Pelvis 1 or 2 View; Future    Right hip pain  -     Large Joint Arthrocentesis: R greater trochanteric bursa    Other orders  -     SCANNED - IMAGING          Large Joint Arthrocentesis: R greater trochanteric bursa  Date/Time: 7/9/2020 4:00 PM  Consent given by: patient  Site marked: site marked  Timeout: Immediately prior to procedure a time out was called to verify the correct patient, procedure, equipment, support staff and site/side marked as required   Supporting Documentation  Indications: pain and joint swelling   Procedure Details  Location: hip - R greater trochanteric bursa  Preparation: Patient was prepped and draped in the usual sterile fashion  Needle size: 22 G  Approach: anterolateral  Medications administered: 2 mL lidocaine 1 %; 160 mg methylPREDNISolone acetate 80 MG/ML  Patient tolerance: patient tolerated the procedure well with no immediate complications        ?    Plan    · Injected patient's right hip over the greater trochanteric bursa with a steroid from a lateral approach.  The patient tolerated the procedure very  well.  · Calcium and vitamin D for bone health.  · There is no indication for surgical intervention for this patient.  · Continue with current work status as discussed with the patient.  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  · Rest, ice, compression, and elevation (RICE) therapy  · Stretching and strengthening exercises of the hip flexors and abductors.  · Tylenol 500-1000mg by mouth every 6 hours as needed for pain   · Follow up in 3 month(s)    Date of encounter: 07/09/2020   Jewel Mcclure MD   12-Feb-2023

## 2023-09-13 ENCOUNTER — TELEPHONE (OUTPATIENT)
Dept: ORTHOPEDIC SURGERY | Facility: CLINIC | Age: 68
End: 2023-09-13
Payer: COMMERCIAL

## 2023-09-13 NOTE — TELEPHONE ENCOUNTER
Caller: SETH AVILA    Relationship to patient: SELF    Best call back number: [phone]    Chief complaint: RIGHT KNEE/RIGHT HIP    Type of visit: CORTISONE INJECTION    Requested date: ASAP    If rescheduling, when is the original appointment: 7/25     Additional notes: ASAP

## 2023-09-14 ENCOUNTER — TELEPHONE (OUTPATIENT)
Dept: CARDIOLOGY | Facility: CLINIC | Age: 68
End: 2023-09-14
Payer: COMMERCIAL

## 2023-09-14 NOTE — TELEPHONE ENCOUNTER
[facility] received a fax that requires your attention. The document has been indexed to the patient’s chart for your review.      Reason for sending: EXTERNAL MEDICAL RECORD NOTIFICATION     Documents Description: [id number]-9.13.23    Name of Sender: JF     Date Indexed: 9.13.23

## 2023-11-15 ENCOUNTER — OFFICE VISIT (OUTPATIENT)
Dept: ORTHOPEDIC SURGERY | Facility: CLINIC | Age: 68
End: 2023-11-15
Payer: MEDICARE

## 2023-11-15 VITALS — TEMPERATURE: 98.6 F | WEIGHT: 173 LBS | HEIGHT: 69 IN | BODY MASS INDEX: 25.62 KG/M2

## 2023-11-15 DIAGNOSIS — M17.31 POST-TRAUMATIC OSTEOARTHRITIS OF RIGHT KNEE: Primary | ICD-10-CM

## 2023-11-15 RX ORDER — METHYLPREDNISOLONE ACETATE 80 MG/ML
160 INJECTION, SUSPENSION INTRA-ARTICULAR; INTRALESIONAL; INTRAMUSCULAR; SOFT TISSUE
Status: COMPLETED | OUTPATIENT
Start: 2023-11-15 | End: 2023-11-15

## 2023-11-15 RX ORDER — LIDOCAINE HYDROCHLORIDE 10 MG/ML
2 INJECTION, SOLUTION EPIDURAL; INFILTRATION; INTRACAUDAL; PERINEURAL
Status: COMPLETED | OUTPATIENT
Start: 2023-11-15 | End: 2023-11-15

## 2023-11-15 RX ADMIN — METHYLPREDNISOLONE ACETATE 160 MG: 80 INJECTION, SUSPENSION INTRA-ARTICULAR; INTRALESIONAL; INTRAMUSCULAR; SOFT TISSUE at 09:18

## 2023-11-15 RX ADMIN — LIDOCAINE HYDROCHLORIDE 2 ML: 10 INJECTION, SOLUTION EPIDURAL; INFILTRATION; INTRACAUDAL; PERINEURAL at 09:18

## 2023-11-15 NOTE — PROGRESS NOTES
"Chief Complaint  Follow-up and Pain of the Right Knee and Follow-up of the Right Shoulder    Subjective    History of Present Illness      Dennis Trinh Jr. is a 68 y.o. male who presents to [facility] for is here today for injection therapy. He receives  RIGHT knee and RIGHT hip injections of steroid. Since last injection in the knee, patient notes substantial relief of symptoms.  Treatment options have been discussed and he understands and consents. He has been doing well and is in cardiac rehab following his bypass in March. He reports he is considering a knee replacement but his daughter and wife want him to see a surgeon at , which is where his cardiologist is, so if he undergoes a surgery the cardiology team is in the same location.       Objective   Vital Signs:   Temp 98.6 °F (37 °C)   Ht 175.3 cm (69\")   Wt 78.5 kg (173 lb)   BMI 25.55 kg/m²     Physical Exam  68 y.o. male is awake, alert, oriented, in no acute distress and well developed, well nourished.  Ortho Exam   RIGHT knee  There is moderate joint line tenderness at the medial aspect of the knee.   Positive for varus orientation of the knee.   Positive for crepitus throughout range of motion.   Negative for effusion.  Positive patellar grind test.   Negative Lachman test.    Negative anterior and posterior drawer.  Range of motion in extension and flexion is: 0-115 degrees.  Neurovascular status is intact.    Dorsalis pedis and posterior tibial artery pulses are palpable.    Common peroneal nerve function is well preserved.  Gait is cautious and antalgic.             PROCEDURE  Large Joint Arthrocentesis: R knee  Date/Time: 11/15/2023 9:18 AM  Consent given by: patient  Site marked: site marked  Timeout: Immediately prior to procedure a time out was called to verify the correct patient, procedure, equipment, support staff and site/side marked as required   Supporting Documentation  Indications: pain   Procedure " Details  Location: knee - R knee  Preparation: Patient was prepped and draped in the usual sterile fashion  Needle size: 25 G  Approach: anteromedial  Medications administered: 2 mL lidocaine PF 1% 1 %; 160 mg methylPREDNISolone acetate 80 MG/ML  Patient tolerance: patient tolerated the procedure well with no immediate complications      Injection site was identified by physical examination and cleaned with Betadine and alcohol swabs. Prior to needle insertion, ethyl chloride spray was used for surface anesthesia. Sterile technique was used.       Assessment   Assessment and Plan    Problem List Items Addressed This Visit          Musculoskeletal and Injuries    Post-traumatic osteoarthritis of right knee - Primary    Relevant Orders    Large Joint Arthrocentesis: R knee         Follow Up   Right knee depo medrol injection was discussed with the patient. Discussed indication, risks, benefits, and alternatives. Verbal consent was given to proceed with the procedure. Patient tolerated the procedure well and no complications were encountered.  Discussion of orthopedic goals and activities and patient/guardian expressed understanding.  Ice, heat, rest, compression and elevation of extremity as beneficial  nsaids and/or tylenol as beneficial  Instructed to refrain from heavy activity/rest the extremity for the next 24-48 hours  Discussion regarding possibility of cortisol flare and what to expect if this occurs  Watch for signs and symptoms of infection  Call if adverse effect from injection therapy  Follow up PRN  Patient was given instructions and counseling regarding his condition or for health maintenance advice. Please see specific information pulled into the AVS if appropriate.     Kirt Guthrie PA-C   Date of Encounter: 11/15/2023   Electronically signed by Kirt Guthrie PA-C, 11/15/23, 9:35 AM EST.       EMR Dragon/Transcription disclaimer:  Much of this encounter note is an electronic  transcription/translation of spoken language to printed text. The electronic translation of spoken language may permit erroneous, or at times, nonsensical words or phrases to be inadvertently transcribed; Although I have reviewed the note for such errors, some may still exist.